# Patient Record
Sex: FEMALE | Race: OTHER | Employment: FULL TIME | URBAN - METROPOLITAN AREA
[De-identification: names, ages, dates, MRNs, and addresses within clinical notes are randomized per-mention and may not be internally consistent; named-entity substitution may affect disease eponyms.]

---

## 2021-02-10 ENCOUNTER — HOSPITAL ENCOUNTER (EMERGENCY)
Age: 31
Discharge: HOME | End: 2021-02-10
Attending: EMERGENCY MEDICINE
Payer: OTHER GOVERNMENT

## 2021-02-10 ENCOUNTER — APPOINTMENT (OUTPATIENT)
Dept: CT IMAGING | Age: 31
End: 2021-02-10
Attending: PHYSICIAN ASSISTANT
Payer: OTHER GOVERNMENT

## 2021-02-10 VITALS
WEIGHT: 106 LBS | HEIGHT: 62 IN | SYSTOLIC BLOOD PRESSURE: 135 MMHG | DIASTOLIC BLOOD PRESSURE: 72 MMHG | TEMPERATURE: 98.6 F | HEART RATE: 99 BPM | BODY MASS INDEX: 19.51 KG/M2 | OXYGEN SATURATION: 99 % | RESPIRATION RATE: 14 BRPM

## 2021-02-10 DIAGNOSIS — R30.0 DYSURIA: Primary | ICD-10-CM

## 2021-02-10 DIAGNOSIS — R10.9 ACUTE RIGHT FLANK PAIN: ICD-10-CM

## 2021-02-10 LAB
ALBUMIN SERPL-MCNC: 3.9 G/DL (ref 3.4–5)
ALBUMIN/GLOB SERPL: 1.2 {RATIO} (ref 0.8–1.7)
ALP SERPL-CCNC: 105 U/L (ref 45–117)
ALT SERPL-CCNC: 20 U/L (ref 13–56)
ANION GAP SERPL CALC-SCNC: 3 MMOL/L (ref 3–18)
APPEARANCE UR: CLEAR
AST SERPL-CCNC: 10 U/L (ref 10–38)
BACTERIA URNS QL MICRO: ABNORMAL /HPF
BASOPHILS # BLD: 0 K/UL (ref 0–0.1)
BASOPHILS NFR BLD: 1 % (ref 0–2)
BILIRUB SERPL-MCNC: 0.2 MG/DL (ref 0.2–1)
BILIRUB UR QL: NEGATIVE
BUN SERPL-MCNC: 16 MG/DL (ref 7–18)
BUN/CREAT SERPL: 22 (ref 12–20)
CALCIUM SERPL-MCNC: 9 MG/DL (ref 8.5–10.1)
CHLORIDE SERPL-SCNC: 109 MMOL/L (ref 100–111)
CO2 SERPL-SCNC: 28 MMOL/L (ref 21–32)
COLOR UR: YELLOW
CREAT SERPL-MCNC: 0.74 MG/DL (ref 0.6–1.3)
DIFFERENTIAL METHOD BLD: NORMAL
EOSINOPHIL # BLD: 0.1 K/UL (ref 0–0.4)
EOSINOPHIL NFR BLD: 2 % (ref 0–5)
EPITH CASTS URNS QL MICRO: ABNORMAL /LPF (ref 0–5)
ERYTHROCYTE [DISTWIDTH] IN BLOOD BY AUTOMATED COUNT: 12.9 % (ref 11.6–14.5)
GLOBULIN SER CALC-MCNC: 3.3 G/DL (ref 2–4)
GLUCOSE SERPL-MCNC: 94 MG/DL (ref 74–99)
GLUCOSE UR STRIP.AUTO-MCNC: NEGATIVE MG/DL
HCG UR QL: NEGATIVE
HCT VFR BLD AUTO: 38.6 % (ref 35–45)
HGB BLD-MCNC: 12.5 G/DL (ref 12–16)
HGB UR QL STRIP: ABNORMAL
KETONES UR QL STRIP.AUTO: ABNORMAL MG/DL
LEUKOCYTE ESTERASE UR QL STRIP.AUTO: NEGATIVE
LIPASE SERPL-CCNC: 121 U/L (ref 73–393)
LYMPHOCYTES # BLD: 1.6 K/UL (ref 0.9–3.6)
LYMPHOCYTES NFR BLD: 30 % (ref 21–52)
MCH RBC QN AUTO: 28.2 PG (ref 24–34)
MCHC RBC AUTO-ENTMCNC: 32.4 G/DL (ref 31–37)
MCV RBC AUTO: 87.1 FL (ref 74–97)
MONOCYTES # BLD: 0.5 K/UL (ref 0.05–1.2)
MONOCYTES NFR BLD: 10 % (ref 3–10)
NEUTS SEG # BLD: 3.1 K/UL (ref 1.8–8)
NEUTS SEG NFR BLD: 57 % (ref 40–73)
NITRITE UR QL STRIP.AUTO: NEGATIVE
PH UR STRIP: 7.5 [PH] (ref 5–8)
PLATELET # BLD AUTO: 288 K/UL (ref 135–420)
PMV BLD AUTO: 9.8 FL (ref 9.2–11.8)
POTASSIUM SERPL-SCNC: 4.1 MMOL/L (ref 3.5–5.5)
PROT SERPL-MCNC: 7.2 G/DL (ref 6.4–8.2)
PROT UR STRIP-MCNC: NEGATIVE MG/DL
RBC # BLD AUTO: 4.43 M/UL (ref 4.2–5.3)
RBC #/AREA URNS HPF: ABNORMAL /HPF (ref 0–5)
SERVICE CMNT-IMP: NORMAL
SODIUM SERPL-SCNC: 140 MMOL/L (ref 136–145)
SP GR UR REFRACTOMETRY: 1.02 (ref 1–1.03)
UROBILINOGEN UR QL STRIP.AUTO: 0.2 EU/DL (ref 0.2–1)
WBC # BLD AUTO: 5.4 K/UL (ref 4.6–13.2)
WBC URNS QL MICRO: ABNORMAL /HPF (ref 0–5)
WET PREP GENITAL: NORMAL

## 2021-02-10 PROCEDURE — 99282 EMERGENCY DEPT VISIT SF MDM: CPT

## 2021-02-10 PROCEDURE — 83690 ASSAY OF LIPASE: CPT

## 2021-02-10 PROCEDURE — 80053 COMPREHEN METABOLIC PANEL: CPT

## 2021-02-10 PROCEDURE — 81025 URINE PREGNANCY TEST: CPT

## 2021-02-10 PROCEDURE — 87491 CHLMYD TRACH DNA AMP PROBE: CPT

## 2021-02-10 PROCEDURE — 85025 COMPLETE CBC W/AUTO DIFF WBC: CPT

## 2021-02-10 PROCEDURE — 87086 URINE CULTURE/COLONY COUNT: CPT

## 2021-02-10 PROCEDURE — 87210 SMEAR WET MOUNT SALINE/INK: CPT

## 2021-02-10 PROCEDURE — 81001 URINALYSIS AUTO W/SCOPE: CPT

## 2021-02-10 PROCEDURE — 74176 CT ABD & PELVIS W/O CONTRAST: CPT

## 2021-02-10 RX ORDER — HYDROCODONE BITARTRATE AND ACETAMINOPHEN 5; 325 MG/1; MG/1
1 TABLET ORAL
Qty: 10 TAB | Refills: 0 | Status: SHIPPED | OUTPATIENT
Start: 2021-02-10 | End: 2021-02-15

## 2021-02-10 RX ORDER — PHENAZOPYRIDINE HYDROCHLORIDE 200 MG/1
200 TABLET, FILM COATED ORAL 3 TIMES DAILY
Qty: 6 TAB | Refills: 0 | Status: SHIPPED | OUTPATIENT
Start: 2021-02-10 | End: 2021-02-12

## 2021-02-10 NOTE — ED TRIAGE NOTES
Pt to ED for right flank pain starting last night, reports recent hx of UTI on 1/25/21, reports giving urine sample Monday at [facility] [place], but told culture was negative today. Pt denies fever/chills, but does report nausea.

## 2021-02-12 LAB
BACTERIA SPEC CULT: NORMAL
C TRACH RRNA SPEC QL NAA+PROBE: NEGATIVE
N GONORRHOEA RRNA SPEC QL NAA+PROBE: NEGATIVE
SERVICE CMNT-IMP: NORMAL
SPECIMEN SOURCE: NORMAL

## 2021-09-17 ENCOUNTER — TRANSCRIBE ORDER (OUTPATIENT)
Dept: SCHEDULING | Age: 31
End: 2021-09-17

## 2021-09-17 DIAGNOSIS — R10.11 RUQ PAIN: Primary | ICD-10-CM

## 2021-09-20 ENCOUNTER — HOSPITAL ENCOUNTER (OUTPATIENT)
Dept: ULTRASOUND IMAGING | Age: 31
Discharge: HOME | End: 2021-09-20
Attending: OBSTETRICS & GYNECOLOGY
Payer: COMMERCIAL

## 2021-09-20 DIAGNOSIS — R10.11 RUQ PAIN: ICD-10-CM

## 2021-09-20 PROCEDURE — 76705 ECHO EXAM OF ABDOMEN: CPT

## 2022-01-24 ENCOUNTER — HOSPITAL ENCOUNTER (OUTPATIENT)
Age: 32
Discharge: HOME | End: 2022-01-24
Attending: OBSTETRICS & GYNECOLOGY | Admitting: OBSTETRICS & GYNECOLOGY
Payer: COMMERCIAL

## 2022-01-24 VITALS
WEIGHT: 140 LBS | DIASTOLIC BLOOD PRESSURE: 62 MMHG | SYSTOLIC BLOOD PRESSURE: 109 MMHG | BODY MASS INDEX: 25.76 KG/M2 | HEIGHT: 62 IN | HEART RATE: 95 BPM

## 2022-01-24 PROBLEM — Z34.90 PREGNANCY: Status: ACTIVE | Noted: 2022-01-24

## 2022-01-24 LAB
APPEARANCE UR: CLEAR
BILIRUB UR QL: NEGATIVE
COLOR UR: YELLOW
FIBRONECTIN FETAL VAG QL: NEGATIVE
GLUCOSE UR QL STRIP.AUTO: 100 MG/DL
KETONES UR-MCNC: 40 MG/DL
LEUKOCYTE ESTERASE UR QL STRIP: NEGATIVE
NITRITE UR QL: NEGATIVE
PH UR: 5.5 [PH] (ref 5–9)
PROT UR QL: ABNORMAL MG/DL
RBC # UR STRIP: NEGATIVE /UL
SERVICE CMNT-IMP: ABNORMAL
SP GR UR: >1.03 (ref 1–1.02)
UROBILINOGEN UR QL: 0.2 EU/DL (ref 0.2–1)

## 2022-01-24 PROCEDURE — 81003 URINALYSIS AUTO W/O SCOPE: CPT

## 2022-01-24 PROCEDURE — 59020 FETAL CONTRACT STRESS TEST: CPT

## 2022-01-24 PROCEDURE — 82731 ASSAY OF FETAL FIBRONECTIN: CPT

## 2022-01-24 PROCEDURE — 59025 FETAL NON-STRESS TEST: CPT

## 2022-01-24 PROCEDURE — 74011250636 HC RX REV CODE- 250/636: Performed by: ADVANCED PRACTICE MIDWIFE

## 2022-01-24 RX ADMIN — SODIUM CHLORIDE, SODIUM LACTATE, POTASSIUM CHLORIDE, AND CALCIUM CHLORIDE 1000 ML: 600; 310; 30; 20 INJECTION, SOLUTION INTRAVENOUS at 18:05

## 2022-01-24 NOTE — PROGRESS NOTES
Presented to L&D c/o abd and back pain since last Monday. Was seen at MD office last week for same. VE at that time cervix closed per pt. Denies ROM and vaginal bleeding. EFM begun. URIAH Welch notified of pt arrival and cc. Orders received.

## 2022-01-25 NOTE — PROGRESS NOTES
2000: FFN negative, FHT reactive with positive fetal movement. No contractions noted. Per oliver Malone to discharge patient to home.

## 2022-01-25 NOTE — PROGRESS NOTES
HPI:    Subjective: reports back and abdominal pain, denies VB or LOF, reports good FM     aisha Jefferson 27 y.o. G1  at 31w0d presents to L&D with c/o see above. Assessment:  Past Medical History:   Diagnosis Date    Ill-defined condition     PFO    Ill-defined condition     migraines    Patent foramen ovale      Past Surgical History:   Procedure Laterality Date    HX GI      upper and lower endoscopy    HX HEENT      wisom teeth       Allergies   Allergen Reactions    Naproxen Anxiety     Prior to Admission medications    Medication Sig Start Date End Date Taking? Authorizing Provider   prenatal multivit-ca-min-fe-fa tab Take  by mouth. Yes Provider, Historical        Objective:     Vitals:  Vitals:    01/24/22 1726   Weight: 63.5 kg (140 lb)   Height: 5' 2\" (1.575 m)        Physical Exam:  Patient without distress. Abdomen: soft, nontender  Fundus: soft and non tender  Perineum: blood absent, amniotic fluid absent  Cervical Exam: Closed/Thick/High  Membranes:  Intact  Fetal Heart Rate: Reactive  Baseline: 145 per minute  Variability: moderate  Accelerations: yes  Decelerations: none  Uterine contractions: none  Uterine irritability: no    Ffn: negative  U/A: Urine dipstick shows positive for moderate glucose, tr. protein, small ketones. S.G. > 1.030    Assessment/Plan: dehydration, reassuring fetal status, no e/o PTL     Plan: DC home with standard & ER labor precautions. Follow-up in office for routine visit.      Signed By:  Jennifer Russell CNM     January 24, 2022

## 2022-03-09 ENCOUNTER — ANESTHESIA EVENT (OUTPATIENT)
Dept: LABOR AND DELIVERY | Age: 32
End: 2022-03-09
Payer: COMMERCIAL

## 2022-03-09 ENCOUNTER — ANESTHESIA (OUTPATIENT)
Dept: LABOR AND DELIVERY | Age: 32
End: 2022-03-09
Payer: COMMERCIAL

## 2022-03-09 ENCOUNTER — HOSPITAL ENCOUNTER (INPATIENT)
Age: 32
LOS: 1 days | Discharge: HOME | End: 2022-03-10
Attending: OBSTETRICS & GYNECOLOGY | Admitting: OBSTETRICS & GYNECOLOGY
Payer: COMMERCIAL

## 2022-03-09 PROBLEM — Z3A.37 37 WEEKS GESTATION OF PREGNANCY: Status: ACTIVE | Noted: 2022-03-09

## 2022-03-09 PROBLEM — Z37.9 NORMAL LABOR: Status: ACTIVE | Noted: 2022-03-09

## 2022-03-09 LAB
ABO + RH BLD: NORMAL
BASOPHILS # BLD: 0.1 K/UL (ref 0–0.1)
BASOPHILS NFR BLD: 0 % (ref 0–2)
BLOOD GROUP ANTIBODIES SERPL: NORMAL
DIFFERENTIAL METHOD BLD: ABNORMAL
EOSINOPHIL # BLD: 0.1 K/UL (ref 0–0.4)
EOSINOPHIL NFR BLD: 0 % (ref 0–5)
ERYTHROCYTE [DISTWIDTH] IN BLOOD BY AUTOMATED COUNT: 13.5 % (ref 11.6–14.5)
HCT VFR BLD AUTO: 34.3 % (ref 35–45)
HGB BLD-MCNC: 11.2 G/DL (ref 12–16)
IMM GRANULOCYTES # BLD AUTO: 0.1 K/UL (ref 0–0.04)
IMM GRANULOCYTES NFR BLD AUTO: 1 % (ref 0–0.5)
LYMPHOCYTES # BLD: 1.6 K/UL (ref 0.9–3.6)
LYMPHOCYTES NFR BLD: 14 % (ref 21–52)
MCH RBC QN AUTO: 27.7 PG (ref 24–34)
MCHC RBC AUTO-ENTMCNC: 32.7 G/DL (ref 31–37)
MCV RBC AUTO: 84.9 FL (ref 78–100)
MONOCYTES # BLD: 0.8 K/UL (ref 0.05–1.2)
MONOCYTES NFR BLD: 7 % (ref 3–10)
NEUTS SEG # BLD: 9.3 K/UL (ref 1.8–8)
NEUTS SEG NFR BLD: 78 % (ref 40–73)
NRBC # BLD: 0 K/UL (ref 0–0.01)
NRBC BLD-RTO: 0 PER 100 WBC
PLATELET # BLD AUTO: 246 K/UL (ref 135–420)
PMV BLD AUTO: 11.1 FL (ref 9.2–11.8)
RBC # BLD AUTO: 4.04 M/UL (ref 4.2–5.3)
SPECIMEN EXP DATE BLD: NORMAL
WBC # BLD AUTO: 11.9 K/UL (ref 4.6–13.2)

## 2022-03-09 PROCEDURE — 85025 COMPLETE CBC W/AUTO DIFF WBC: CPT

## 2022-03-09 PROCEDURE — 0HQ9XZZ REPAIR PERINEUM SKIN, EXTERNAL APPROACH: ICD-10-PCS | Performed by: OBSTETRICS & GYNECOLOGY

## 2022-03-09 PROCEDURE — 75410000002 HC LABOR FEE PER 1 HR

## 2022-03-09 PROCEDURE — 74011000250 HC RX REV CODE- 250: Performed by: SPECIALIST

## 2022-03-09 PROCEDURE — 59025 FETAL NON-STRESS TEST: CPT

## 2022-03-09 PROCEDURE — 77030019905 HC CATH URETH INTMIT MDII -A

## 2022-03-09 PROCEDURE — 99282 EMERGENCY DEPT VISIT SF MDM: CPT

## 2022-03-09 PROCEDURE — 74011250636 HC RX REV CODE- 250/636: Performed by: SPECIALIST

## 2022-03-09 PROCEDURE — 76060000078 HC EPIDURAL ANESTHESIA

## 2022-03-09 PROCEDURE — 74011000258 HC RX REV CODE- 258: Performed by: SPECIALIST

## 2022-03-09 PROCEDURE — 86900 BLOOD TYPING SEROLOGIC ABO: CPT

## 2022-03-09 PROCEDURE — 4A1HXCZ MONITORING OF PRODUCTS OF CONCEPTION, CARDIAC RATE, EXTERNAL APPROACH: ICD-10-PCS | Performed by: OBSTETRICS & GYNECOLOGY

## 2022-03-09 PROCEDURE — 77030009413 HC ELECTRD SCALP COVD -A

## 2022-03-09 PROCEDURE — 75410000003 HC RECOV DEL/VAG/CSECN EA 0.5 HR

## 2022-03-09 PROCEDURE — 74011250637 HC RX REV CODE- 250/637: Performed by: OBSTETRICS & GYNECOLOGY

## 2022-03-09 PROCEDURE — 88307 TISSUE EXAM BY PATHOLOGIST: CPT

## 2022-03-09 PROCEDURE — 65270000029 HC RM PRIVATE

## 2022-03-09 PROCEDURE — 75410000000 HC DELIVERY VAGINAL/SINGLE

## 2022-03-09 RX ORDER — SODIUM CHLORIDE, SODIUM LACTATE, POTASSIUM CHLORIDE, CALCIUM CHLORIDE 600; 310; 30; 20 MG/100ML; MG/100ML; MG/100ML; MG/100ML
125 INJECTION, SOLUTION INTRAVENOUS CONTINUOUS
Status: DISCONTINUED | OUTPATIENT
Start: 2022-03-09 | End: 2022-03-09 | Stop reason: HOSPADM

## 2022-03-09 RX ORDER — PROMETHAZINE HYDROCHLORIDE 25 MG/ML
25 INJECTION, SOLUTION INTRAMUSCULAR; INTRAVENOUS
Status: DISCONTINUED | OUTPATIENT
Start: 2022-03-09 | End: 2022-03-11 | Stop reason: HOSPADM

## 2022-03-09 RX ORDER — OXYCODONE AND ACETAMINOPHEN 5; 325 MG/1; MG/1
2 TABLET ORAL
Status: DISCONTINUED | OUTPATIENT
Start: 2022-03-09 | End: 2022-03-11 | Stop reason: HOSPADM

## 2022-03-09 RX ORDER — NALBUPHINE HYDROCHLORIDE 10 MG/ML
10 INJECTION, SOLUTION INTRAMUSCULAR; INTRAVENOUS; SUBCUTANEOUS
Status: DISCONTINUED | OUTPATIENT
Start: 2022-03-09 | End: 2022-03-09 | Stop reason: HOSPADM

## 2022-03-09 RX ORDER — LIDOCAINE HYDROCHLORIDE 10 MG/ML
20 INJECTION, SOLUTION EPIDURAL; INFILTRATION; INTRACAUDAL; PERINEURAL AS NEEDED
Status: DISCONTINUED | OUTPATIENT
Start: 2022-03-09 | End: 2022-03-09 | Stop reason: HOSPADM

## 2022-03-09 RX ORDER — LANOLIN ALCOHOL/MO/W.PET/CERES
3 CREAM (GRAM) TOPICAL
Status: DISCONTINUED | OUTPATIENT
Start: 2022-03-09 | End: 2022-03-11 | Stop reason: HOSPADM

## 2022-03-09 RX ORDER — SODIUM CHLORIDE 0.9 % (FLUSH) 0.9 %
5-40 SYRINGE (ML) INJECTION EVERY 8 HOURS
Status: DISCONTINUED | OUTPATIENT
Start: 2022-03-09 | End: 2022-03-09 | Stop reason: HOSPADM

## 2022-03-09 RX ORDER — IBUPROFEN 400 MG/1
800 TABLET ORAL
Status: DISCONTINUED | OUTPATIENT
Start: 2022-03-09 | End: 2022-03-11 | Stop reason: HOSPADM

## 2022-03-09 RX ORDER — METHYLERGONOVINE MALEATE 0.2 MG/ML
0.2 INJECTION INTRAVENOUS AS NEEDED
Status: DISCONTINUED | OUTPATIENT
Start: 2022-03-09 | End: 2022-03-09 | Stop reason: HOSPADM

## 2022-03-09 RX ORDER — SODIUM CHLORIDE 0.9 % (FLUSH) 0.9 %
5-40 SYRINGE (ML) INJECTION AS NEEDED
Status: DISCONTINUED | OUTPATIENT
Start: 2022-03-09 | End: 2022-03-09 | Stop reason: HOSPADM

## 2022-03-09 RX ORDER — FENTANYL/ROPIVACAINE/NS/PF 2MCG/ML-.1
1-15 PLASTIC BAG, INJECTION (ML) EPIDURAL
Status: DISCONTINUED | OUTPATIENT
Start: 2022-03-09 | End: 2022-03-09 | Stop reason: HOSPADM

## 2022-03-09 RX ORDER — FENTANYL CITRATE 50 UG/ML
100 INJECTION, SOLUTION INTRAMUSCULAR; INTRAVENOUS ONCE
Status: COMPLETED | OUTPATIENT
Start: 2022-03-09 | End: 2022-03-09

## 2022-03-09 RX ORDER — OXYTOCIN/RINGER'S LACTATE 30/500 ML
10 PLASTIC BAG, INJECTION (ML) INTRAVENOUS AS NEEDED
Status: DISCONTINUED | OUTPATIENT
Start: 2022-03-09 | End: 2022-03-09 | Stop reason: HOSPADM

## 2022-03-09 RX ORDER — OXYTOCIN/0.9 % SODIUM CHLORIDE 30/500 ML
87.3 PLASTIC BAG, INJECTION (ML) INTRAVENOUS AS NEEDED
Status: DISCONTINUED | OUTPATIENT
Start: 2022-03-09 | End: 2022-03-09 | Stop reason: HOSPADM

## 2022-03-09 RX ORDER — LIDOCAINE HYDROCHLORIDE AND EPINEPHRINE 15; 5 MG/ML; UG/ML
INJECTION, SOLUTION EPIDURAL
Status: SHIPPED | OUTPATIENT
Start: 2022-03-09 | End: 2022-03-09

## 2022-03-09 RX ORDER — AMOXICILLIN 250 MG
1 CAPSULE ORAL
Status: DISCONTINUED | OUTPATIENT
Start: 2022-03-09 | End: 2022-03-11 | Stop reason: HOSPADM

## 2022-03-09 RX ORDER — FENTANYL CITRATE 50 UG/ML
INJECTION, SOLUTION INTRAMUSCULAR; INTRAVENOUS AS NEEDED
Status: DISCONTINUED | OUTPATIENT
Start: 2022-03-09 | End: 2022-03-09 | Stop reason: HOSPADM

## 2022-03-09 RX ORDER — ACETAMINOPHEN 325 MG/1
650 TABLET ORAL
Status: DISCONTINUED | OUTPATIENT
Start: 2022-03-09 | End: 2022-03-11 | Stop reason: HOSPADM

## 2022-03-09 RX ORDER — TERBUTALINE SULFATE 1 MG/ML
0.25 INJECTION SUBCUTANEOUS
Status: DISCONTINUED | OUTPATIENT
Start: 2022-03-09 | End: 2022-03-09 | Stop reason: HOSPADM

## 2022-03-09 RX ORDER — BUTORPHANOL TARTRATE 2 MG/ML
2 INJECTION INTRAMUSCULAR; INTRAVENOUS
Status: DISCONTINUED | OUTPATIENT
Start: 2022-03-09 | End: 2022-03-09 | Stop reason: HOSPADM

## 2022-03-09 RX ORDER — MINERAL OIL
30 OIL (ML) ORAL AS NEEDED
Status: DISCONTINUED | OUTPATIENT
Start: 2022-03-09 | End: 2022-03-09 | Stop reason: HOSPADM

## 2022-03-09 RX ORDER — HYDROMORPHONE HYDROCHLORIDE 1 MG/ML
1 INJECTION, SOLUTION INTRAMUSCULAR; INTRAVENOUS; SUBCUTANEOUS
Status: DISCONTINUED | OUTPATIENT
Start: 2022-03-09 | End: 2022-03-09 | Stop reason: HOSPADM

## 2022-03-09 RX ADMIN — FENTANYL CITRATE 100 MCG: 50 INJECTION, SOLUTION INTRAMUSCULAR; INTRAVENOUS at 10:34

## 2022-03-09 RX ADMIN — ROPIVACAINE HYDROCHLORIDE 10 ML/HR: 5 INJECTION, SOLUTION EPIDURAL; INFILTRATION; PERINEURAL at 16:26

## 2022-03-09 RX ADMIN — ROPIVACAINE HYDROCHLORIDE 10 ML/HR: 5 INJECTION, SOLUTION EPIDURAL; INFILTRATION; PERINEURAL at 10:34

## 2022-03-09 RX ADMIN — ACETAMINOPHEN 650 MG: 325 TABLET ORAL at 15:45

## 2022-03-09 RX ADMIN — LIDOCAINE HYDROCHLORIDE,EPINEPHRINE BITARTRATE 5 ML: 15; .005 INJECTION, SOLUTION EPIDURAL; INFILTRATION; INTRACAUDAL; PERINEURAL at 10:25

## 2022-03-09 RX ADMIN — FENTANYL CITRATE 100 MCG: 50 INJECTION, SOLUTION INTRAMUSCULAR; INTRAVENOUS at 10:35

## 2022-03-09 NOTE — H&P
Ostetrical History and Physical    Subjective:     Date of Admission: 3/9/2022    Patient is a 27 y.o.   female admitted with labor, with SROM in office during exam.    For Obstetric history, see junl.    For Review of Systems, see prenatal    Past Medical History:   Diagnosis Date    Ill-defined condition     PFO    Ill-defined condition     migraines    Patent foramen ovale       Past Surgical History:   Procedure Laterality Date    HX GI      upper and lower endoscopy    HX HEENT      wisom teeth      Prior to Admission medications    Medication Sig Start Date End Date Taking? Authorizing Provider   prenatal multivit-ca-min-fe-fa tab Take  by mouth. Yes Provider, Historical     Allergies   Allergen Reactions    Naproxen Anxiety      Social History     Tobacco Use    Smoking status: Never Smoker    Smokeless tobacco: Never Used   Substance Use Topics    Alcohol use: No      Family History   Problem Relation Age of Onset    Cancer Mother     Hypertension Mother     Asthma Father     Heart Disease Father     Hypertension Father     Diabetes Maternal Grandmother     Diabetes Maternal Grandfather     Stroke Paternal Grandmother     Heart Disease Paternal Grandfather     Stroke Paternal Grandfather           Objective:     Height 5' 2\" (1.575 m), weight 68.9 kg (152 lb), last menstrual period 2021. No data recorded. No intake/output data recorded. No intake/output data recorded. HEENT: No pallor, no jaundice, Thyroid and throat normal  RESPIRATORY: Clear to A & P  CVS: pulse reg, HS normal  ABDOMEN: Gravid. Vertex. EFW=8lb +-1lb. No abnormal tenderness.    Pelvic: Cervix 5,   Effaced: 75%  Station:  -2  Data Review:   Recent Results (from the past 24 hour(s))   CBC WITH AUTOMATED DIFF    Collection Time: 22  9:38 AM   Result Value Ref Range    WBC 11.9 4.6 - 13.2 K/uL    RBC 4.04 (L) 4.20 - 5.30 M/uL    HGB 11.2 (L) 12.0 - 16.0 g/dL    HCT 34.3 (L) 35.0 - 45.0 % MCV 84.9 78.0 - 100.0 FL    MCH 27.7 24.0 - 34.0 PG    MCHC 32.7 31.0 - 37.0 g/dL    RDW 13.5 11.6 - 14.5 %    PLATELET 453 129 - 285 K/uL    MPV 11.1 9.2 - 11.8 FL    NRBC 0.0 0  WBC    ABSOLUTE NRBC 0.00 0.00 - 0.01 K/uL    NEUTROPHILS 78 (H) 40 - 73 %    LYMPHOCYTES 14 (L) 21 - 52 %    MONOCYTES 7 3 - 10 %    EOSINOPHILS 0 0 - 5 %    BASOPHILS 0 0 - 2 %    IMMATURE GRANULOCYTES 1 (H) 0.0 - 0.5 %    ABS. NEUTROPHILS 9.3 (H) 1.8 - 8.0 K/UL    ABS. LYMPHOCYTES 1.6 0.9 - 3.6 K/UL    ABS. MONOCYTES 0.8 0.05 - 1.2 K/UL    ABS. EOSINOPHILS 0.1 0.0 - 0.4 K/UL    ABS. BASOPHILS 0.1 0.0 - 0.1 K/UL    ABS. IMM. GRANS. 0.1 (H) 0.00 - 0.04 K/UL    DF AUTOMATED     TYPE & SCREEN    Collection Time: 03/09/22  9:38 AM   Result Value Ref Range    Crossmatch Expiration 03/12/2022,2359     ABO/Rh(D) [employer] POSITIVE     Antibody screen NEG      Monitor:  Reactivity:present Variability:present Baseline:within normal limits    Assessment:     Active Problems:    Normal labor (3/9/2022)      37 weeks gestation of pregnancy (3/9/2022)        Plan:   Allow to continue in labor. Epidural in    Check labs:  CBC  Check  Prenatal:    Disposition:     Type of admit:In-Patient    I saw and examined patient.     Signed By: Leandra Acevedo MD                         March 9, 2022

## 2022-03-09 NOTE — PROGRESS NOTES
1016- Update given to dr Fang Moulton aware of patient request for epidural    1025- Dr. April Moulton at bedside for epidural     1029- Timeout complete    1032- test dose given    1638- Test pushing, SVE complete    1643- Dr. Lavon Triplett called for delivery    78 [phone]- Begin pushing    1713- TOB live male infant    5- TRANSFER - OUT REPORT:    Verbal report given to GILBERT Martins (name) on Louretta Meals  being transferred to Mother Baby (unit) for routine progression of care       Report consisted of patients Situation, Background, Assessment and   Recommendations(SBAR). Information from the following report(s) SBAR, Kardex and MAR was reviewed with the receiving nurse. Lines:   Peripheral IV 03/09/22 Posterior;Right Hand (Active)        Opportunity for questions and clarification was provided.       Patient transported with:   Registered Nurse

## 2022-03-09 NOTE — ANESTHESIA PROCEDURE NOTES
Epidural Block    Patient location during procedure: OB  Start time: 3/9/2022 10:25 AM  End time: 3/9/2022 10:37 AM  Reason for block: labor epidural  Staffing  Performed: attending   Anesthesiologist: Jb Hebert MD  Preanesthetic Checklist  Completed: patient identified, IV checked, site marked, risks and benefits discussed, surgical consent, monitors and equipment checked, pre-op evaluation and timeout performed  Block Placement  Patient position: sitting  Prep: ChloraPrep  Sterility prep: drape, cap, gloves, gown, hand and mask  Sedation level: no sedation  Patient monitoring: frequent blood pressure checks and heart rate  Approach: midline  Location: lumbar  Epidural  Loss of resistance technique: air  Guidance: landmark technique  Needle  Needle type: Tuohy   Needle gauge: 17 G  Needle length: 9 cm  Needle insertion depth: 5 cm  Catheter type: end hole  Catheter size: 19 G  Catheter at skin depth: 9 cm  Catheter securement method: clear occlusive dressing, liquid medical adhesive and surgical tape  Test dose: negative  Medications Administered  Lidocaine-EPINEPHrine (XYLOCAINE) 1.5 %-1:200,000 Epidural, 5 mL  Assessment  Block outcome: pain improved  Number of attempts: 1  Procedure assessment: patient tolerated procedure well with no immediate complications

## 2022-03-09 NOTE — ANESTHESIA PREPROCEDURE EVALUATION
Relevant Problems   No relevant active problems       Anesthetic History   No history of anesthetic complications            Review of Systems / Medical History  Patient summary reviewed, nursing notes reviewed and pertinent labs reviewed    Pulmonary  Within defined limits                 Neuro/Psych         Headaches     Cardiovascular                    Comments: PFO   GI/Hepatic/Renal  Within defined limits              Endo/Other  Within defined limits           Other Findings              Physical Exam    Airway  Mallampati: II  TM Distance: 4 - 6 cm  Neck ROM: normal range of motion   Mouth opening: Normal     Cardiovascular               Dental  No notable dental hx       Pulmonary                 Abdominal         Other Findings            Anesthetic Plan    ASA: 2  Anesthesia type: epidural            Anesthetic plan and risks discussed with: Patient and Family      R/B discussed including ha backache bleeding infection nerve damage paralysis.

## 2022-03-10 VITALS
SYSTOLIC BLOOD PRESSURE: 113 MMHG | DIASTOLIC BLOOD PRESSURE: 67 MMHG | WEIGHT: 152 LBS | BODY MASS INDEX: 27.97 KG/M2 | HEIGHT: 62 IN | TEMPERATURE: 98.5 F | OXYGEN SATURATION: 99 % | HEART RATE: 101 BPM | RESPIRATION RATE: 18 BRPM

## 2022-03-10 LAB
HCT VFR BLD AUTO: 31 % (ref 35–45)
HGB BLD-MCNC: 9.9 G/DL (ref 12–16)

## 2022-03-10 PROCEDURE — 74011250637 HC RX REV CODE- 250/637: Performed by: OBSTETRICS & GYNECOLOGY

## 2022-03-10 PROCEDURE — 36415 COLL VENOUS BLD VENIPUNCTURE: CPT

## 2022-03-10 PROCEDURE — 85018 HEMOGLOBIN: CPT

## 2022-03-10 RX ADMIN — IBUPROFEN 800 MG: 400 TABLET, FILM COATED ORAL at 16:11

## 2022-03-10 RX ADMIN — IBUPROFEN 800 MG: 400 TABLET, FILM COATED ORAL at 00:19

## 2022-03-10 RX ADMIN — IBUPROFEN 800 MG: 400 TABLET, FILM COATED ORAL at 07:51

## 2022-03-10 NOTE — PROGRESS NOTES
0745 Bedside and Verbal shift change report given to GILBERT Schmitz RN (oncoming nurse) by THUAN Robbins LPN (offgoing nurse). Report included the following information SBAR, Kardex, Procedure Summary, Intake/Output, MAR and Recent Results. 0 motrin given    0915 shift assessment complete    1005 IV removed    1153 patient attempting to feed     1345 patient has no needs or concerns at this time    1525 reassessment complete, educated patient on how to stimulate  to stay nursing    1720 patient nursing , to call when finished    1855  back to room    1935 Bedside and Verbal shift change report given to KELLE Hooks RN (oncoming nurse) by Elise Salgado RN (offgoing nurse). Report included the following information SBAR, Kardex, Procedure Summary, Intake/Output, MAR and Recent Results.

## 2022-03-10 NOTE — PROGRESS NOTES
1406 SVE 9.5/100/0.    1417 Dr. Monty Baltazar called for delivery. 1422 Straight cath performed. 1 Dr. Monty Baltazar at bedside. Pushing started. Received orders to labor down. 1437 Assisted patient to right lateral side with left leg in stirrup.

## 2022-03-10 NOTE — PROGRESS NOTES
Problem: Patient Education: Go to Patient Education Activity  Goal: Patient/Family Education  Outcome: Progressing Towards Goal     Problem: Vaginal Delivery: Day of Delivery-Post delivery  Goal: Activity/Safety  Outcome: Progressing Towards Goal  Goal: Consults, if ordered  Outcome: Progressing Towards Goal  Goal: Nutrition/Diet  Outcome: Progressing Towards Goal  Goal: Discharge Planning  Outcome: Progressing Towards Goal  Goal: Medications  Outcome: Progressing Towards Goal  Goal: Treatments/Interventions/Procedures  Outcome: Progressing Towards Goal  Goal: *Vital signs within defined limits  Outcome: Progressing Towards Goal  Goal: *Labs within defined limits  Outcome: Progressing Towards Goal  Goal: *Hemodynamically stable  Outcome: Progressing Towards Goal  Goal: *Optimal pain control at patient's stated goal  Outcome: Progressing Towards Goal  Goal: *Participates in infant care  Outcome: Progressing Towards Goal  Goal: *Demonstrates progressive activity  Outcome: Progressing Towards Goal  Goal: *Tolerating diet  Outcome: Progressing Towards Goal     Problem: Pain  Goal: *Control of Pain  Outcome: Progressing Towards Goal     Problem: Patient Education: Go to Patient Education Activity  Goal: Patient/Family Education  Outcome: Progressing Towards Goal

## 2022-03-10 NOTE — PROGRESS NOTES
TRANSFER - IN REPORT:    Verbal report received from EDUARDO Encarnacion RN (name) on [employer] Stamp  being received from L and D (unit) for routine progression of care      Report consisted of patients Situation, Background, Assessment and   Recommendations(SBAR). Information from the following report(s) SBAR, Intake/Output, MAR and Recent Results was reviewed with the receiving nurse. Opportunity for questions and clarification was provided. Assessment completed upon patients arrival to unit and care assumed. VSS. Oriented to room and unit. Denies needs. 2100-ambulated to bathroom. Voided without diff. Denies needs. 2235-VSS. Assessment completed. Denies needs. 0019-pain med given. Resting in bed.  0215-up in room. Denies needs. 0400-breast feeding. 0415-ambulated to [facility] for infant bath. 0445-back out to room with infant. 0600-resting in bed.  0745-Bedside and Verbal shift change report given to GILBERT Schmitz RN  (oncoming nurse) by THUAN Robbins LPN (offgoing nurse). Report given with SBAR, Kardex, Intake/Output, MAR and Recent Results.

## 2022-03-10 NOTE — PROGRESS NOTES
Progress Note    Patient: Rohit Baum MRN: [id number]  SSN: [id number]    YOB: 1990  Age: 27 y.o. Sex: female    ROOM:  [facility]/01      Subjective:     Postpartum Day: 1            Delivery: vaginal delivery    The patient feels well. The patient denies emotional concerns. The baby is well. Baby is feeding via breast.  The patient is ambulating well. The patient  tolerating a normal diet. Flatus has been passed.     Objective:      Patient Vitals for the past 24 hrs:   BP Temp Pulse Resp SpO2 Height Weight   03/09/22 2235 113/65 98 °F (36.7 °C) (!) 101 16 99 %     03/09/22 2005 117/72 98.8 °F (37.1 °C) (!) 116 16 98 %     03/09/22 1842     98 %     03/09/22 1837   (!) 107  99 %     03/09/22 1836 112/63         03/09/22 1832     98 %     03/09/22 1827     99 %     03/09/22 1822   (!) 108  98 %     03/09/22 1821 (!) 110/58         03/09/22 1817     98 %     03/09/22 1812     97 %     03/09/22 1807   (!) 115  97 %     03/09/22 1806 109/65         03/09/22 1802     97 %     03/09/22 1757     98 %     03/09/22 1752 (!) 106/51  (!) 119  98 %     03/09/22 1747     98 %     03/09/22 1742     99 %     03/09/22 1732     98 %     03/09/22 1714     97 %     03/09/22 1709     100 %     03/09/22 1704     99 %     03/09/22 1659     98 %     03/09/22 1651 135/69  (!) 122       03/09/22 1650     100 %     03/09/22 1645     100 %     03/09/22 1641 127/84  (!) 113       03/09/22 1640     100 %     03/09/22 1635     100 %     03/09/22 1631 123/71  (!) 114       03/09/22 1630     100 %     03/09/22 1625     100 %     03/09/22 1621 118/68  (!) 110       03/09/22 1620     99 %     03/09/22 1615     100 %     03/09/22 1611 124/70  (!) 111       03/09/22 1610     99 %     03/09/22 1605     100 %     03/09/22 1602 122/78  (!) 113       03/09/22 1600     100 %     03/09/22 1555     100 %     03/09/22 1551 126/68  (!) 119       03/09/22 1550     99 %     03/09/22 1545     99 %     03/09/22 1541 130/66  (!) 115       03/09/22 1540     99 %     03/09/22 1535     99 %     03/09/22 1531 126/70  (!) 122       03/09/22 1530     100 %     03/09/22 1525     99 %     03/09/22 1521 111/61  (!) 125       03/09/22 1520     100 %     03/09/22 1515     100 %     03/09/22 1511 116/76  (!) 116       03/09/22 1510     99 %     03/09/22 1505     100 %     03/09/22 1502 112/64  (!) 123       03/09/22 1500     100 %     03/09/22 1455     99 %     03/09/22 1452 (!) 119/57  (!) 124       03/09/22 1450     100 %     03/09/22 1445     100 %     03/09/22 1442 (!) 104/59  (!) 121       03/09/22 1440     100 %     03/09/22 1435     99 %     03/09/22 1431 130/78  (!) 124       03/09/22 1421 135/82  (!) 128       03/09/22 1411 118/87  (!) 119       03/09/22 1401 119/74  (!) 113       03/09/22 1351 121/78  (!) 108       03/09/22 1341 115/70  (!) 106       03/09/22 1332 97/73  (!) 119       03/09/22 1321 121/76  (!) 109       03/09/22 1311 116/80  (!) 111       03/09/22 1301 124/76  (!) 111       03/09/22 1251 121/73  (!) 101       03/09/22 1241 97/79  (!) 110       03/09/22 1231 111/78  (!) 102       03/09/22 1221 119/78  (!) 101       03/09/22 1211 117/73  97       03/09/22 1201 106/73  93       03/09/22 1157     97 %     03/09/22 1155     98 %     03/09/22 1151 111/72  97       03/09/22 1150     98 %     03/09/22 1145     100 %     03/09/22 1142     99 %     03/09/22 1141 114/74  97       03/09/22 1137     98 %     03/09/22 1132   96  98 %     03/09/22 1131 111/65       03/09/22 1127     98 %     03/09/22 1122   (!) 101  95 %     03/09/22 1121 114/71         03/09/22 1117     98 %     03/09/22 1112   97  98 %     03/09/22 1111 120/68         03/09/22 1107     98 %     03/09/22 1102   98  99 %     03/09/22 1101 111/74         03/09/22 1057     98 %     03/09/22 1052 (!) 107/35  100  99 %     03/09/22 1047   (!) 106  99 %     03/09/22 1046 117/73         03/09/22 1043 (!) 113/57  94       03/09/22 1042     99 %     03/09/22 1039 (!) 182/167  (!) 112       03/09/22 1037     98 %     03/09/22 1033 (!) 144/86  (!) 122       03/09/22 1032     98 %     03/09/22 0943      5' 2\" (1.575 m) 68.9 kg (152 lb)     Lochia:  appropriate   Uterine Fundus:   firm   Fundus Location:  -3   Incision:      DVT Evaluation:  No evidence of DVT seen on physical exam.  Negative Kolton's sign. No cords or calf tenderness. No significant calf/ankle edema. Lab/Data Review: All lab results for the last 24 hours reviewed. LABS: Recent Results (from the past 48 hour(s))   CBC WITH AUTOMATED DIFF    Collection Time: 03/09/22  9:38 AM   Result Value Ref Range    WBC 11.9 4.6 - 13.2 K/uL    RBC 4.04 (L) 4.20 - 5.30 M/uL    HGB 11.2 (L) 12.0 - 16.0 g/dL    HCT 34.3 (L) 35.0 - 45.0 %    MCV 84.9 78.0 - 100.0 FL    MCH 27.7 24.0 - 34.0 PG    MCHC 32.7 31.0 - 37.0 g/dL    RDW 13.5 11.6 - 14.5 %    PLATELET 163 338 - 895 K/uL    MPV 11.1 9.2 - 11.8 FL    NRBC 0.0 0  WBC    ABSOLUTE NRBC 0.00 0.00 - 0.01 K/uL    NEUTROPHILS 78 (H) 40 - 73 %    LYMPHOCYTES 14 (L) 21 - 52 %    MONOCYTES 7 3 - 10 %    EOSINOPHILS 0 0 - 5 %    BASOPHILS 0 0 - 2 %    IMMATURE GRANULOCYTES 1 (H) 0.0 - 0.5 %    ABS. NEUTROPHILS 9.3 (H) 1.8 - 8.0 K/UL    ABS. LYMPHOCYTES 1.6 0.9 - 3.6 K/UL    ABS. MONOCYTES 0.8 0.05 - 1.2 K/UL    ABS. EOSINOPHILS 0.1 0.0 - 0.4 K/UL    ABS. BASOPHILS 0.1 0.0 - 0.1 K/UL    ABS. IMM. GRANS. 0.1 (H) 0.00 - 0.04 K/UL    DF AUTOMATED     TYPE & SCREEN    Collection Time: 03/09/22  9:38 AM   Result Value Ref Range    Crossmatch Expiration 03/12/2022,6720     ABO/Rh(D) [place] POSITIVE     Antibody screen NEG    HGB & HCT    Collection Time: 03/10/22  4:15 AM   Result Value Ref Range    HGB 9.9 (L) 12.0 - 16.0 g/dL    HCT 31.0 (L) 35.0 - 45.0 %        Assessment:     Status post: Doing well postpartum vaginal delivery     Plan:     Postpartum care discussed including diet, ambulation, and actvitiy restrictions. Discussed circumcision: Benefits are that it may be easier to keep clean and various ethnic and Restorationist customs. Risks are bleeding most commonly, which is resolved with pressure or hemostatic gels or sponges. Scarring and infection are possible but extremely unlikely. It may not be be possible to remove all that we would like to remove if the shaft is short, as this would be more likely to lead to scarring. Wants circ done. Discharge instructions and questions answered.        Signed By: Denny Kay MD     March 10, 2022

## 2022-03-11 NOTE — LACTATION NOTE
This note was copied from a baby's chart. 25 Mom educated on breastfeeding basics--hunger cues, feeding on demand, waking baby if baby sleeps too long between feeds, importance of skin to skin, positioning and latching, risk of pacifier use and supplemental feedings, and importance of rooming in--and use of log sheet. Mom also educated on benefits of breastfeeding for herself and baby. Mom verbalized understanding. No questions at this time. Per mom, infant latching and nursing well.  Encouraged to call for assistance

## 2022-03-11 NOTE — PROGRESS NOTES
1935 Bedside and Verbal shift change report given to KELLE Caal RN (oncoming nurse) by Tanna Burgos. Courtney Desai RN (offgoing nurse). Report included the following information SBAR, Kardex, Procedure Summary, Intake/Output, MAR and Recent Results. 2100 Patient stable. OB assessment completed. 2110 Discharge teaching completed. No questions or concerns at this time. 2130 Patient discharged.

## 2022-03-11 NOTE — PROGRESS NOTES
Paged by RN for discharge orders. Informed by RN that plan was for mom to go home if baby is able to, and baby was cleared for discharge.

## 2022-03-11 NOTE — DISCHARGE INSTRUCTIONS
POST DELIVERY DISCHARGE INSTRUCTIONS    Name: Tonia Do  YOB: 1990  Primary Diagnosis: Active Problems:    Normal labor (3/9/2022)      37 weeks gestation of pregnancy (3/9/2022)      General:     Diet/Diet Restrictions:  Eight 8-ounce glasses of fluid daily (water, juices); avoid excessive caffeine intake. Meals/snacks as desired which are high in fiber and carbohydrates and low in fat and cholesterol. Physical Activity / Restrictions / Safety:     Avoid heavy lifting, no more that 8 lbs. For 2-3 weeks; limit use of stairs to 2 times daily for the first week home. No driving for one week. Avoid intercourse 4-6 weeks, no douching or tampon use. Check with obstetrician before starting or resuming an exercise program.         Discharge Instructions/Special Treatment/Home Care Needs:     Continue prenatal vitamins. Continue to use squirt bottle with warm water on your perineal area after each bathroom use until bleeding stops. Call your doctor for the following:     Fever over 100.4 degrees by mouth. Vaginal bleeding heavier than a normal menstrual period or clot larger than a golf ball. Red streaks or increased swelling of legs, painful red streaks on your breast.  Painful urination, constipation and increased pain or swelling or discharge with your incision. If you feel extremely anxious or overwhelmed. If you have thoughts of harming yourself and/or your baby. Pain Management:     Pain Management:   Take Acetaminophen (Tylenol) or Ibuprofen (Advil, Motrin), as directed for pain. Use a warm Sitz bath 3 times daily to relieve episiotomy or hemorrhoidal discomfort. Heating pad to  incision as needed. For hemorrhoidal discomfort, use Tucks and Anusol cream as needed and directed.

## 2022-03-18 PROBLEM — Z34.90 PREGNANCY: Status: ACTIVE | Noted: 2022-01-24

## 2022-03-19 PROBLEM — Z3A.37 37 WEEKS GESTATION OF PREGNANCY: Status: ACTIVE | Noted: 2022-03-09

## 2022-03-20 PROBLEM — Z37.9 NORMAL LABOR: Status: ACTIVE | Noted: 2022-03-09

## 2024-07-08 ENCOUNTER — HOSPITAL ENCOUNTER (OUTPATIENT)
Facility: HOSPITAL | Age: 34
Setting detail: RECURRING SERIES
Discharge: HOME | End: 2024-07-11
Payer: COMMERCIAL

## 2024-07-08 PROCEDURE — 97161 PT EVAL LOW COMPLEX 20 MIN: CPT

## 2024-07-08 PROCEDURE — 97110 THERAPEUTIC EXERCISES: CPT

## 2024-07-08 NOTE — PROGRESS NOTES
[facility]  [address] [place] [address]  Ph [phone]  Fx [phone]    Plan of Care/ Statement of Necessity for Physical Therapy Services    Patient name: Radha Flores Start of Care: 2024   Referral source: Hailee Tang APRN -* : 1990    Medical Diagnosis: Other symptoms and signs involving the genitourinary system [R39.89]   Onset Date:3/3/2024    Treatment Diagnosis: Other symptoms and signs involving the genitourinary system [R39.89]   Prior Hospitalization: see medical history Provider#: [id number]   Medications: Verified on Patient summary List     Comorbidities: 2 pregnancies/vaginal birth and currently 30 weeks  (due : 2024), umbilical hernia      Prior Level of Function: active lifestyle, No UI and no pubic symphysis pain           The Plan of Care and following information is based on the information from the initial evaluation.  Assessment/ key information: Patient is a 34 year old female presenting to Physical Therapy with c/o pubic symphysis pain, LBP,  and mixed urinary incontinence which is limiting ability function at previous level. Patient has  pain complaints with palpation of bilateral piriformis. Patient presents with decreased strength, coordination, and endurance of the pelvic floor muscles and decreased strength of postural stabilizers.    Patient presents with limited understanding of bladder and bowel anatomy/function, dietary irritants, and urge suppression. I feel patient would benefit from skilled therapeutic intervention to optimize highest functional level possible.     Evaluation Complexity HistoryHIGH Complexity :3+ comorbidities / personal factors will impact the outcome/ POC  ; Examination MEDIUM Complexity : 3 Standardized tests and measures addressin body structure, function, activity limitation and / or participation in recreation  ;Presentation MEDIUM Complexity : Evolving with changing characteristics  ;Clinical 
  Pt will report reduced pain of suprapubic region to 4/10 at worst to allow her to perform  activities with increased ease  Eval: 9/10 at worst    Pt will report voiding interval of 1 hours to improve QOL  Eval:   30 min        Long term goals: To be achieved in 8 treatments::  Pt reports bladder continence 75% of the time with cough/sneeze/laugh & walking to the toilet.   Eval: pt stress & urgency incontinence 1-3 times per day    Pt will report voiding interval of 1.5-2 hours to improve QOL  Eval:   30 min     Pt will report reduced pain of pubic symphysis region to 2/10 at worst to allow her to perform her work with increased ease  Eval: 10/10 at worst     Pt will report reduced pain of coccyx region to 2/10 at worst to allow her to perform her work with increased ease  Eval: 8-9/10 at worst     Pt will report reduced pain of lumbar region to 2/10 at worst to allow her to perform  activities with increased ease  Eval: 8-9/10 at worst     Pt will report reduced pain of suprapubic region to 2/10 at worst to allow her to perform  activities with increased ease  Eval: 9/10 at worst     Pt demonstrates improvement of current complaints evidenced by a 45 point  improvement in PFDI-20 to indicate increased QOL.  Eval:  75     Pt demonstrates independence with management tools & exercise program that are beneficial for current condition in order to feel comfortable with Pelvic floor PT D/C & not fear.  Eval: pt unaware of what activities to do reduce her symptoms and to avoid exacerbation of current condition   PLAN  []  Upgrade activities as tolerated     [x]  Continue plan of care  []  Update interventions per flow sheet       []  Discharge due to:_  []  Other:_      Adrianne Luis, PT 7/8/2024  10:28 AM

## 2024-07-18 ENCOUNTER — HOSPITAL ENCOUNTER (OUTPATIENT)
Facility: HOSPITAL | Age: 34
Setting detail: RECURRING SERIES
Discharge: HOME | End: 2024-07-21
Payer: COMMERCIAL

## 2024-07-18 PROCEDURE — 97535 SELF CARE MNGMENT TRAINING: CPT

## 2024-07-18 PROCEDURE — 97110 THERAPEUTIC EXERCISES: CPT

## 2024-07-18 PROCEDURE — 97112 NEUROMUSCULAR REEDUCATION: CPT

## 2024-07-18 PROCEDURE — 97530 THERAPEUTIC ACTIVITIES: CPT

## 2024-07-18 NOTE — PROGRESS NOTES
PHYSICAL / OCCUPATIONAL THERAPY - DAILY TREATMENT NOTE     Patient Name: Radha Born    Date: 2024    : 1990  Insurance: Payor: LEWIS / Plan: LEWIS RPN / Product Type: *No Product type* /      Patient  verified Yes     Visit #   Current / Total 2 8   Time   In / Out 12:31 1:06   Pain   In / Out 1-2 0   Subjective Functional Status/Changes: Patient reports having cervical check last Thursday and reports baby's head is very low  Patient will be 32 weeks pregnant tomorrow  Patient also recalls falling when 2 months postpartum with first child and tailbone pain back then   Changes to:  Allergies, Med Hx, Sx Hx?   no       TREATMENT AREA =  Other symptoms and signs involving the genitourinary system [R39.89]    OBJECTIVE      Therapeutic Procedures:  Tx Min Billable or 1:1 Min (if diff from Tx Min) Procedure, Rationale, Specifics   8  06155 Therapeutic Exercise (timed):  increase ROM, strength, coordination, balance, and proprioception to improve patient's ability to progress to PLOF and address remaining functional goals. (see flow sheet as applicable)    Details if applicable:       11  06988 Neuromuscular Re-Education (timed):  improve balance, coordination, kinesthetic sense, posture, core stability and proprioception to improve patient's ability to develop conscious control of individual muscles and awareness of position of extremities in order to progress to PLOF and address remaining functional goals. (see flow sheet as applicable)    Details if applicable:     8  36536 Therapeutic Activity (timed):  use of dynamic activities replicating functional movements to increase ROM, strength, coordination, balance, and proprioception in order to improve patient's ability to progress to PLOF and address remaining functional goals.  (see flow sheet as applicable)     Details if applicable:     8  88908 Self Care/Home Management (timed):  improve patient knowledge and understanding of pain reducing

## 2024-07-26 ENCOUNTER — HOSPITAL ENCOUNTER (OUTPATIENT)
Facility: HOSPITAL | Age: 34
Setting detail: RECURRING SERIES
Discharge: HOME | End: 2024-07-29
Payer: COMMERCIAL

## 2024-07-26 PROCEDURE — 97530 THERAPEUTIC ACTIVITIES: CPT

## 2024-07-26 PROCEDURE — 97112 NEUROMUSCULAR REEDUCATION: CPT

## 2024-07-26 PROCEDURE — 97535 SELF CARE MNGMENT TRAINING: CPT

## 2024-07-26 PROCEDURE — 97110 THERAPEUTIC EXERCISES: CPT

## 2024-07-26 NOTE — PROGRESS NOTES
health to aid in management of urinary urgency & incontinence.  Eval: Pt consuming insufficient amount of water  & unaware of bladder fitness, dietary irritants & urge suppression strategies.     Pt will report using strategies to reduce pubic symphysis pain to improve her ability to perform her job  Eval:  pt was aware of some strategies.  Patient was educated on more strategies.   7/18: reviewed with patient bed mobility to decrease pubic symphysis pain     Pt reports improvement in UI complaints evidenced by decrease in pad usage &/or amount of leakage by 50% to improve QOL.  Eval: Pt using 0-1 pads (panitliner) per day, generally damp (amt of leakage).      Pt will report reduced pain of pubic symphysis region to 5/10 at worst to allow her to perform her work with increased ease  Eval: 10/10 at worst  Current: 9/10 pain   7/26/2024, no significant change      Pt will report reduced pain of coccyx region to 4/10 at worst to allow her to perform her work with increased ease  Eval: 8-9/10 at worst  Current: 9/10 pain   7/26/2024, no significant change      Pt will report reduced pain of lumbar region to 4/10 at worst to allow her to perform  activities with increased ease  Eval: 8-9/10 at worst  Current: 9/10 pain   7/26/2024, no significant change      Pt will report reduced pain of suprapubic region to 4/10 at worst to allow her to perform  activities with increased ease  Eval: 9/10 at worst  Current: 9/10 pain   7/26/2024, no significant change      Pt will report voiding interval of 1 hours to improve QOL  Eval:   30 min      Long term goals: To be achieved in 8 treatments::  Pt reports bladder continence 75% of the time with cough/sneeze/laugh & walking to the toilet.   Eval: pt stress & urgency incontinence 1-3 times per day     Pt will report voiding interval of 1.5-2 hours to improve QOL  Eval:   30 min     Pt will report reduced pain of pubic symphysis region to 2/10 at worst to allow

## 2024-07-30 ENCOUNTER — TELEPHONE (OUTPATIENT)
Facility: HOSPITAL | Age: 34
End: 2024-07-30

## 2024-08-01 ENCOUNTER — APPOINTMENT (OUTPATIENT)
Facility: HOSPITAL | Age: 34
End: 2024-08-01
Payer: COMMERCIAL

## 2024-08-08 ENCOUNTER — APPOINTMENT (OUTPATIENT)
Facility: HOSPITAL | Age: 34
End: 2024-08-08
Payer: COMMERCIAL

## 2024-08-15 ENCOUNTER — HOSPITAL ENCOUNTER (OUTPATIENT)
Facility: HOSPITAL | Age: 34
Setting detail: RECURRING SERIES
Discharge: HOME | End: 2024-08-18
Payer: COMMERCIAL

## 2024-08-15 PROCEDURE — 97140 MANUAL THERAPY 1/> REGIONS: CPT

## 2024-08-15 PROCEDURE — 97110 THERAPEUTIC EXERCISES: CPT

## 2024-08-15 PROCEDURE — 97530 THERAPEUTIC ACTIVITIES: CPT

## 2024-08-15 NOTE — PROGRESS NOTES
PHYSICAL / OCCUPATIONAL THERAPY - DAILY TREATMENT NOTE     Patient Name: Radha Born    Date: 8/15/2024    : 1990  Insurance: Payor: LEWIS / Plan: LEWIS RPN / Product Type: *No Product type* /      Patient  verified Yes     Visit #   Current / Total 4 8   Time   In / Out 1150 1240   Pain   In / Out 2 0   Subjective Functional Status/Changes: Pt reports she will be induced at 39 weeks - approx 3 weeks.   Changes to:  Allergies, Med Hx, Sx Hx?   no       TREATMENT AREA =  Other symptoms and signs involving the genitourinary system [R39.89]    If an interpreting service is utilized for treatment of this patient, the contents of this document represent the material reviewed with the patient via the .     OBJECTIVE          Therapeutic Procedures:  Tx Min Billable or 1:1 Min (if diff from Tx Min) Procedure, Rationale, Specifics   15 15 03139 Manual Therapy (timed):  decrease pain, increase ROM, and increase tissue extensibility to improve patient's ability to progress to PLOF and address remaining functional goals.  The manual therapy interventions were performed at a separate and distinct time from the therapeutic activities interventions . Details: tripod for coccygeus, iliococcygeus; stuck drawer ; cpping for piriformis     Details if applicable:       10 10 81582 Therapeutic Activity (timed):  use of dynamic activities replicating functional movements to increase ROM, strength, coordination, balance, and proprioception in order to improve patient's ability to progress to PLOF and address remaining functional goals.  (see flow sheet as applicable)    Details if applicable:      79010 Therapeutic Exercise (timed):  increase ROM, strength, coordination, balance, and proprioception to improve patient's ability to progress to PLOF and address remaining functional goals. (see flow sheet as applicable)     Details if applicable:           Details if applicable:            Details if applicable:

## 2024-08-15 NOTE — PROGRESS NOTES
[facility]  [address] [place] [address]  Ph [phone]  Fx [phone]    Physical Therapy Progress Note  Patient name: Radha Flores Start of Care: 2024   Referral source: Hailee Tang APRN -* : 1990               Medical Diagnosis: Other symptoms and signs involving the genitourinary system [R39.89]    Onset Date:3/3/2024               Treatment Diagnosis: Other symptoms and signs involving the genitourinary system [R39.89]   Prior Hospitalization: see medical history Provider#: [id number]   Medications: Verified on Patient summary List      Comorbidities: 2 pregnancies/vaginal birth and currently 30 weeks  (due : 2024), umbilical hernia       Prior Level of Function: active lifestyle, No UI and no pubic symphysis pain     Visits from Start of Care: 4    Missed Visits: 1    Updated Goals/Measure of Progress: To be achieved in 4 treatments:    Short term goals: To be achieved in 4 treatments::  Pt demonstrates proper dietary/fluid habits & urge suppression strategies that promote bladder & bowel health to aid in management of urinary urgency & incontinence.  Eval: Pt consuming insufficient amount of water  & unaware of bladder fitness, dietary irritants & urge suppression strategies.  Current:  66 ounces (goal 70-72 ounces)  No change 8/15/2024     Pt will report using strategies to reduce pubic symphysis pain to improve her ability to perform her job  Eval:  pt was aware of some strategies.  Patient was educated on more strategies.   : reviewed with patient bed mobility to decrease pubic symphysis pain  PN:  pt reports trying to do the strategies.  GOAL MET 8/15/2024     Pt reports improvement in UI complaints evidenced by decrease in pad usage &/or amount of leakage by 50% to improve QOL.  Eval: Pt using 0-1 pads (panitliner) per day, generally damp (amt of leakage).   pN:  no leakage  Pt reports 50% improvement  GOAL MET 8/15/2024     Pt will report

## 2024-08-22 ENCOUNTER — HOSPITAL ENCOUNTER (OUTPATIENT)
Facility: HOSPITAL | Age: 34
Setting detail: RECURRING SERIES
Discharge: HOME | End: 2024-08-25
Payer: COMMERCIAL

## 2024-08-22 PROCEDURE — 97535 SELF CARE MNGMENT TRAINING: CPT

## 2024-08-22 PROCEDURE — 97112 NEUROMUSCULAR REEDUCATION: CPT

## 2024-08-22 PROCEDURE — 97110 THERAPEUTIC EXERCISES: CPT

## 2024-08-22 PROCEDURE — 97140 MANUAL THERAPY 1/> REGIONS: CPT

## 2024-08-22 NOTE — PROGRESS NOTES
PHYSICAL / OCCUPATIONAL THERAPY - DAILY TREATMENT NOTE (updated )    Patient Name: Radha Born    Date: 2024    : 1990  Insurance: Payor: LEWIS / Plan: LEWIS RPN / Product Type: *No Product type* /      Patient  verified YES   Visit #   Current / Total 5 8   Time   In / Out 11:56 12:37   Pain   In / Out 5-6 5   Subjective Functional Status/Changes: Patient had OB check this mornincm dilated and 50% effaced. Not feeling great. 37 weeks tomorrow and having Siletz-Roberts.     Reports improvement in tailbone pain from previous session's interventions.   Changes to:  Meds, Allergies, Med Hx, Sx Hx?  If yes, update Summary List YES/NO     TREATMENT AREA =  Other symptoms and signs involving the genitourinary system [R39.89]    OBJECTIVE           Therapeutic Procedures:    Tx Min Billable or 1:1 Min (if diff from Tx Min) Procedure, Rationale, Specifics   12  22492 Neuromuscular Re-Education (timed):  improve balance, coordination, kinesthetic sense, posture, core stability and proprioception to improve patient's ability to develop conscious control of individual muscles and awareness of position of extremities in order to progress to PLOF and address remaining functional goals. (see flow sheet as applicable)     Details if applicable:    []  Pelvic floor strengthening                 []  Pelvic floor downtraining  []  Quality pelvic floor contractions       []  Relaxation techniques  []  Urge suppression exercises  []  Other: TA coordination        8  93162 Self Care/Home Management (timed):  improve patient knowledge and understanding of pain reducing techniques, positioning, and posture/ergonomics  to improve patient's ability to progress to PLOF and address remaining functional goals.  (see flow sheet as applicable)     Details if applicable:    []  Pelvic floor strengthening                 []  Pelvic floor downtraining  []  Quality pelvic floor contractions       []  Relaxation techniques  []

## 2024-08-24 ENCOUNTER — ANESTHESIA EVENT (OUTPATIENT)
Facility: HOSPITAL | Age: 34
End: 2024-08-24
Payer: COMMERCIAL

## 2024-08-24 ENCOUNTER — ANESTHESIA (OUTPATIENT)
Facility: HOSPITAL | Age: 34
End: 2024-08-24
Payer: COMMERCIAL

## 2024-08-24 ENCOUNTER — HOSPITAL ENCOUNTER (INPATIENT)
Facility: HOSPITAL | Age: 34
LOS: 1 days | Discharge: HOME | End: 2024-08-25
Attending: OBSTETRICS & GYNECOLOGY | Admitting: OBSTETRICS & GYNECOLOGY
Payer: COMMERCIAL

## 2024-08-24 PROBLEM — O47.9 UTERINE CONTRACTIONS: Status: ACTIVE | Noted: 2024-08-24

## 2024-08-24 LAB
ABO + RH BLD: NORMAL
APPEARANCE UR: CLEAR
BASOPHILS # BLD: 0 K/UL (ref 0–0.1)
BASOPHILS NFR BLD: 0 % (ref 0–2)
BILIRUB UR QL: NEGATIVE
BLOOD GROUP ANTIBODIES SERPL: NORMAL
COLOR UR: NORMAL
DIFFERENTIAL METHOD BLD: ABNORMAL
EOSINOPHIL # BLD: 0.1 K/UL (ref 0–0.4)
EOSINOPHIL NFR BLD: 1 % (ref 0–5)
ERYTHROCYTE [DISTWIDTH] IN BLOOD BY AUTOMATED COUNT: 13.7 % (ref 11.6–14.5)
GLUCOSE UR QL STRIP.AUTO: NEGATIVE MG/DL
HCT VFR BLD AUTO: 34.2 % (ref 35–45)
HGB BLD-MCNC: 11 G/DL (ref 12–16)
IMM GRANULOCYTES # BLD AUTO: 0.1 K/UL (ref 0–0.04)
IMM GRANULOCYTES NFR BLD AUTO: 1 % (ref 0–0.5)
KETONES UR-MCNC: NEGATIVE MG/DL
LEUKOCYTE ESTERASE UR QL STRIP: NEGATIVE
LYMPHOCYTES # BLD: 1.5 K/UL (ref 0.9–3.6)
LYMPHOCYTES NFR BLD: 14 % (ref 21–52)
MCH RBC QN AUTO: 26.6 PG (ref 24–34)
MCHC RBC AUTO-ENTMCNC: 32.2 G/DL (ref 31–37)
MCV RBC AUTO: 82.6 FL (ref 78–100)
MONOCYTES # BLD: 0.7 K/UL (ref 0.05–1.2)
MONOCYTES NFR BLD: 7 % (ref 3–10)
NEUTS SEG # BLD: 7.9 K/UL (ref 1.8–8)
NEUTS SEG NFR BLD: 77 % (ref 40–73)
NITRITE UR QL: NEGATIVE
NRBC # BLD: 0 K/UL (ref 0–0.01)
NRBC BLD-RTO: 0 PER 100 WBC
PH UR: 6.5 (ref 5–9)
PLATELET # BLD AUTO: 255 K/UL (ref 135–420)
PMV BLD AUTO: 10.1 FL (ref 9.2–11.8)
PROT UR QL: NEGATIVE MG/DL
RBC # BLD AUTO: 4.14 M/UL (ref 4.2–5.3)
RBC # UR STRIP: NEGATIVE
SERVICE CMNT-IMP: NORMAL
SP GR UR: 1.01 (ref 1–1.02)
SPECIMEN EXP DATE BLD: NORMAL
UROBILINOGEN UR QL: 0.2 EU/DL (ref 0.2–1)
WBC # BLD AUTO: 10.3 K/UL (ref 4.6–13.2)

## 2024-08-24 PROCEDURE — 6360000002 HC RX W HCPCS: Performed by: ADVANCED PRACTICE MIDWIFE

## 2024-08-24 PROCEDURE — 3700000156 HC EPIDURAL ANESTHESIA

## 2024-08-24 PROCEDURE — 2500000003 HC RX 250 WO HCPCS: Performed by: NURSE ANESTHETIST, CERTIFIED REGISTERED

## 2024-08-24 PROCEDURE — 99283 EMERGENCY DEPT VISIT LOW MDM: CPT

## 2024-08-24 PROCEDURE — 3700000025 EPIDURAL BLOCK: Performed by: NURSE ANESTHETIST, CERTIFIED REGISTERED

## 2024-08-24 PROCEDURE — 86900 BLOOD TYPING SEROLOGIC ABO: CPT

## 2024-08-24 PROCEDURE — 86901 BLOOD TYPING SEROLOGIC RH(D): CPT

## 2024-08-24 PROCEDURE — 6370000000 HC RX 637 (ALT 250 FOR IP): Performed by: ADVANCED PRACTICE MIDWIFE

## 2024-08-24 PROCEDURE — 7220000101 HC DELIVERY VAGINAL/SINGLE

## 2024-08-24 PROCEDURE — 85025 COMPLETE CBC W/AUTO DIFF WBC: CPT

## 2024-08-24 PROCEDURE — 59025 FETAL NON-STRESS TEST: CPT

## 2024-08-24 PROCEDURE — 7210000100 HC LABOR FEE PER 1 HR

## 2024-08-24 PROCEDURE — 51702 INSERT TEMP BLADDER CATH: CPT

## 2024-08-24 PROCEDURE — 81003 URINALYSIS AUTO W/O SCOPE: CPT

## 2024-08-24 PROCEDURE — 86592 SYPHILIS TEST NON-TREP QUAL: CPT

## 2024-08-24 PROCEDURE — 00HU33Z INSERTION OF INFUSION DEVICE INTO SPINAL CANAL, PERCUTANEOUS APPROACH: ICD-10-PCS | Performed by: NURSE ANESTHETIST, CERTIFIED REGISTERED

## 2024-08-24 PROCEDURE — 2580000003 HC RX 258: Performed by: ADVANCED PRACTICE MIDWIFE

## 2024-08-24 PROCEDURE — 7100000000 HC PACU RECOVERY - FIRST 15 MIN

## 2024-08-24 PROCEDURE — 86850 RBC ANTIBODY SCREEN: CPT

## 2024-08-24 PROCEDURE — 1100000000 HC RM PRIVATE

## 2024-08-24 PROCEDURE — 10907ZC DRAINAGE OF AMNIOTIC FLUID, THERAPEUTIC FROM PRODUCTS OF CONCEPTION, VIA NATURAL OR ARTIFICIAL OPENING: ICD-10-PCS | Performed by: OBSTETRICS & GYNECOLOGY

## 2024-08-24 PROCEDURE — 7100000001 HC PACU RECOVERY - ADDTL 15 MIN

## 2024-08-24 RX ORDER — MISOPROSTOL 200 UG/1
200 TABLET ORAL PRN
Status: DISCONTINUED | OUTPATIENT
Start: 2024-08-24 | End: 2024-08-25 | Stop reason: HOSPADM

## 2024-08-24 RX ORDER — SODIUM CHLORIDE, SODIUM LACTATE, POTASSIUM CHLORIDE, CALCIUM CHLORIDE 600; 310; 30; 20 MG/100ML; MG/100ML; MG/100ML; MG/100ML
INJECTION, SOLUTION INTRAVENOUS CONTINUOUS
Status: DISCONTINUED | OUTPATIENT
Start: 2024-08-24 | End: 2024-08-24 | Stop reason: HOSPADM

## 2024-08-24 RX ORDER — DIPHENHYDRAMINE HYDROCHLORIDE 50 MG/ML
25 INJECTION INTRAMUSCULAR; INTRAVENOUS EVERY 6 HOURS PRN
Status: DISCONTINUED | OUTPATIENT
Start: 2024-08-24 | End: 2024-08-24 | Stop reason: HOSPADM

## 2024-08-24 RX ORDER — HYDROMORPHONE HYDROCHLORIDE 1 MG/ML
1 INJECTION, SOLUTION INTRAMUSCULAR; INTRAVENOUS; SUBCUTANEOUS EVERY 4 HOURS PRN
Status: DISCONTINUED | OUTPATIENT
Start: 2024-08-24 | End: 2024-08-24 | Stop reason: HOSPADM

## 2024-08-24 RX ORDER — FENTANYL/ROPIVACAINE/NS/PF 2MCG/ML-.2
9 PLASTIC BAG, INJECTION (ML) INJECTION CONTINUOUS
Status: DISCONTINUED | OUTPATIENT
Start: 2024-08-24 | End: 2024-08-24 | Stop reason: HOSPADM

## 2024-08-24 RX ORDER — SEVOFLURANE 250 ML/250ML
1 LIQUID RESPIRATORY (INHALATION) CONTINUOUS PRN
Status: DISCONTINUED | OUTPATIENT
Start: 2024-08-24 | End: 2024-08-24 | Stop reason: HOSPADM

## 2024-08-24 RX ORDER — SODIUM CHLORIDE, SODIUM LACTATE, POTASSIUM CHLORIDE, AND CALCIUM CHLORIDE .6; .31; .03; .02 G/100ML; G/100ML; G/100ML; G/100ML
500 INJECTION, SOLUTION INTRAVENOUS PRN
Status: DISCONTINUED | OUTPATIENT
Start: 2024-08-24 | End: 2024-08-24 | Stop reason: HOSPADM

## 2024-08-24 RX ORDER — SODIUM CHLORIDE 0.9 % (FLUSH) 0.9 %
5-40 SYRINGE (ML) INJECTION PRN
Status: DISCONTINUED | OUTPATIENT
Start: 2024-08-24 | End: 2024-08-24 | Stop reason: HOSPADM

## 2024-08-24 RX ORDER — TRANEXAMIC ACID 10 MG/ML
1000 INJECTION, SOLUTION INTRAVENOUS
Status: DISPENSED | OUTPATIENT
Start: 2024-08-24 | End: 2024-08-25

## 2024-08-24 RX ORDER — MISOPROSTOL 200 UG/1
400 TABLET ORAL PRN
Status: DISCONTINUED | OUTPATIENT
Start: 2024-08-24 | End: 2024-08-25 | Stop reason: HOSPADM

## 2024-08-24 RX ORDER — SODIUM CHLORIDE 0.9 % (FLUSH) 0.9 %
5-40 SYRINGE (ML) INJECTION EVERY 12 HOURS SCHEDULED
Status: DISCONTINUED | OUTPATIENT
Start: 2024-08-24 | End: 2024-08-25 | Stop reason: HOSPADM

## 2024-08-24 RX ORDER — SODIUM CHLORIDE, SODIUM LACTATE, POTASSIUM CHLORIDE, AND CALCIUM CHLORIDE .6; .31; .03; .02 G/100ML; G/100ML; G/100ML; G/100ML
500 INJECTION, SOLUTION INTRAVENOUS ONCE
Status: DISCONTINUED | OUTPATIENT
Start: 2024-08-24 | End: 2024-08-24 | Stop reason: HOSPADM

## 2024-08-24 RX ORDER — ONDANSETRON 2 MG/ML
4 INJECTION INTRAMUSCULAR; INTRAVENOUS EVERY 6 HOURS PRN
Status: DISCONTINUED | OUTPATIENT
Start: 2024-08-24 | End: 2024-08-25 | Stop reason: HOSPADM

## 2024-08-24 RX ORDER — LIDOCAINE HYDROCHLORIDE 10 MG/ML
30 INJECTION, SOLUTION EPIDURAL; INFILTRATION; INTRACAUDAL; PERINEURAL PRN
Status: DISCONTINUED | OUTPATIENT
Start: 2024-08-24 | End: 2024-08-25 | Stop reason: HOSPADM

## 2024-08-24 RX ORDER — IBUPROFEN 400 MG/1
800 TABLET, FILM COATED ORAL EVERY 8 HOURS SCHEDULED
Status: DISCONTINUED | OUTPATIENT
Start: 2024-08-24 | End: 2024-08-25 | Stop reason: HOSPADM

## 2024-08-24 RX ORDER — DIPHENHYDRAMINE HCL 25 MG
25 CAPSULE ORAL EVERY 6 HOURS PRN
Status: DISCONTINUED | OUTPATIENT
Start: 2024-08-24 | End: 2024-08-24 | Stop reason: HOSPADM

## 2024-08-24 RX ORDER — LANOLIN/MINERAL OIL
LOTION (ML) TOPICAL PRN
Status: DISCONTINUED | OUTPATIENT
Start: 2024-08-24 | End: 2024-08-25 | Stop reason: HOSPADM

## 2024-08-24 RX ORDER — TERBUTALINE SULFATE 1 MG/ML
0.25 INJECTION, SOLUTION SUBCUTANEOUS
Status: DISCONTINUED | OUTPATIENT
Start: 2024-08-24 | End: 2024-08-24 | Stop reason: HOSPADM

## 2024-08-24 RX ORDER — HYDROMORPHONE HYDROCHLORIDE 2 MG/1
1 TABLET ORAL EVERY 4 HOURS PRN
Status: DISCONTINUED | OUTPATIENT
Start: 2024-08-24 | End: 2024-08-25 | Stop reason: HOSPADM

## 2024-08-24 RX ORDER — ACETAMINOPHEN 500 MG
1000 TABLET ORAL EVERY 8 HOURS SCHEDULED
Status: DISCONTINUED | OUTPATIENT
Start: 2024-08-24 | End: 2024-08-25 | Stop reason: HOSPADM

## 2024-08-24 RX ORDER — NALOXONE HYDROCHLORIDE 0.4 MG/ML
INJECTION, SOLUTION INTRAMUSCULAR; INTRAVENOUS; SUBCUTANEOUS PRN
Status: DISCONTINUED | OUTPATIENT
Start: 2024-08-24 | End: 2024-08-24 | Stop reason: HOSPADM

## 2024-08-24 RX ORDER — LIDOCAINE HCL/EPINEPHRINE/PF 2%-1:200K
VIAL (ML) INJECTION PRN
Status: DISCONTINUED | OUTPATIENT
Start: 2024-08-24 | End: 2024-08-24 | Stop reason: SDUPTHER

## 2024-08-24 RX ORDER — DOCUSATE SODIUM 100 MG/1
100 CAPSULE, LIQUID FILLED ORAL 2 TIMES DAILY
Status: DISCONTINUED | OUTPATIENT
Start: 2024-08-24 | End: 2024-08-25 | Stop reason: HOSPADM

## 2024-08-24 RX ORDER — EPHEDRINE SULFATE/0.9% NACL/PF 50 MG/5 ML
10 SYRINGE (ML) INTRAVENOUS AS NEEDED
Status: DISCONTINUED | OUTPATIENT
Start: 2024-08-24 | End: 2024-08-24 | Stop reason: HOSPADM

## 2024-08-24 RX ORDER — CARBOPROST TROMETHAMINE 250 UG/ML
250 INJECTION, SOLUTION INTRAMUSCULAR PRN
Status: DISCONTINUED | OUTPATIENT
Start: 2024-08-24 | End: 2024-08-24 | Stop reason: HOSPADM

## 2024-08-24 RX ORDER — SODIUM CHLORIDE 0.9 % (FLUSH) 0.9 %
5-40 SYRINGE (ML) INJECTION EVERY 12 HOURS SCHEDULED
Status: DISCONTINUED | OUTPATIENT
Start: 2024-08-24 | End: 2024-08-24 | Stop reason: HOSPADM

## 2024-08-24 RX ORDER — ONDANSETRON 4 MG/1
4 TABLET, ORALLY DISINTEGRATING ORAL EVERY 6 HOURS PRN
Status: DISCONTINUED | OUTPATIENT
Start: 2024-08-24 | End: 2024-08-25 | Stop reason: HOSPADM

## 2024-08-24 RX ORDER — METHYLERGONOVINE MALEATE 0.2 MG/ML
200 INJECTION INTRAVENOUS PRN
Status: DISCONTINUED | OUTPATIENT
Start: 2024-08-24 | End: 2024-08-24 | Stop reason: HOSPADM

## 2024-08-24 RX ORDER — SODIUM CHLORIDE 9 MG/ML
INJECTION, SOLUTION INTRAVENOUS PRN
Status: DISCONTINUED | OUTPATIENT
Start: 2024-08-24 | End: 2024-08-24 | Stop reason: HOSPADM

## 2024-08-24 RX ORDER — SODIUM CHLORIDE 0.9 % (FLUSH) 0.9 %
5-40 SYRINGE (ML) INJECTION PRN
Status: DISCONTINUED | OUTPATIENT
Start: 2024-08-24 | End: 2024-08-25 | Stop reason: HOSPADM

## 2024-08-24 RX ORDER — ACETAMINOPHEN 325 MG/1
650 TABLET ORAL EVERY 4 HOURS PRN
Status: DISCONTINUED | OUTPATIENT
Start: 2024-08-24 | End: 2024-08-24 | Stop reason: HOSPADM

## 2024-08-24 RX ORDER — SODIUM CHLORIDE 9 MG/ML
INJECTION, SOLUTION INTRAVENOUS PRN
Status: DISCONTINUED | OUTPATIENT
Start: 2024-08-24 | End: 2024-08-25 | Stop reason: HOSPADM

## 2024-08-24 RX ORDER — SODIUM CHLORIDE, SODIUM LACTATE, POTASSIUM CHLORIDE, AND CALCIUM CHLORIDE .6; .31; .03; .02 G/100ML; G/100ML; G/100ML; G/100ML
1000 INJECTION, SOLUTION INTRAVENOUS PRN
Status: DISCONTINUED | OUTPATIENT
Start: 2024-08-24 | End: 2024-08-24 | Stop reason: HOSPADM

## 2024-08-24 RX ORDER — TRANEXAMIC ACID 10 MG/ML
1000 INJECTION, SOLUTION INTRAVENOUS
Status: DISCONTINUED | OUTPATIENT
Start: 2024-08-24 | End: 2024-08-24 | Stop reason: HOSPADM

## 2024-08-24 RX ADMIN — LIDOCAINE HYDROCHLORIDE,EPINEPHRINE BITARTRATE 2 ML: 20; .005 INJECTION, SOLUTION EPIDURAL; INFILTRATION; INTRACAUDAL; PERINEURAL at 07:25

## 2024-08-24 RX ADMIN — SODIUM CHLORIDE, POTASSIUM CHLORIDE, SODIUM LACTATE AND CALCIUM CHLORIDE: 600; 310; 30; 20 INJECTION, SOLUTION INTRAVENOUS at 07:18

## 2024-08-24 RX ADMIN — SODIUM CHLORIDE, POTASSIUM CHLORIDE, SODIUM LACTATE AND CALCIUM CHLORIDE 1000 ML: 600; 310; 30; 20 INJECTION, SOLUTION INTRAVENOUS at 06:19

## 2024-08-24 RX ADMIN — Medication 166.7 ML: at 11:41

## 2024-08-24 RX ADMIN — ACETAMINOPHEN 1000 MG: 500 TABLET ORAL at 21:01

## 2024-08-24 RX ADMIN — LIDOCAINE HYDROCHLORIDE,EPINEPHRINE BITARTRATE 3 ML: 20; .005 INJECTION, SOLUTION EPIDURAL; INFILTRATION; INTRACAUDAL; PERINEURAL at 07:21

## 2024-08-24 RX ADMIN — OXYTOCIN 6 MILLI-UNITS/MIN: 10 INJECTION, SOLUTION INTRAMUSCULAR; INTRAVENOUS at 09:12

## 2024-08-24 RX ADMIN — IBUPROFEN 800 MG: 400 TABLET, FILM COATED ORAL at 18:17

## 2024-08-24 RX ADMIN — IBUPROFEN 800 MG: 400 TABLET, FILM COATED ORAL at 21:00

## 2024-08-24 RX ADMIN — ACETAMINOPHEN 1000 MG: 500 TABLET ORAL at 13:33

## 2024-08-24 RX ADMIN — DOCUSATE SODIUM 100 MG: 100 CAPSULE, LIQUID FILLED ORAL at 21:01

## 2024-08-24 RX ADMIN — DOCUSATE SODIUM 100 MG: 100 CAPSULE, LIQUID FILLED ORAL at 18:16

## 2024-08-24 RX ADMIN — Medication 9 ML/HR: at 07:22

## 2024-08-24 ASSESSMENT — PAIN DESCRIPTION - ORIENTATION
ORIENTATION: MID
ORIENTATION: LOWER
ORIENTATION: MID

## 2024-08-24 ASSESSMENT — PAIN DESCRIPTION - DESCRIPTORS
DESCRIPTORS: CRAMPING
DESCRIPTORS: ACHING
DESCRIPTORS: SORE

## 2024-08-24 ASSESSMENT — PAIN - FUNCTIONAL ASSESSMENT
PAIN_FUNCTIONAL_ASSESSMENT: ACTIVITIES ARE NOT PREVENTED

## 2024-08-24 ASSESSMENT — PAIN DESCRIPTION - LOCATION
LOCATION: HEAD
LOCATION: ABDOMEN
LOCATION: ABDOMEN

## 2024-08-24 ASSESSMENT — PAIN SCALES - GENERAL
PAINLEVEL_OUTOF10: 7
PAINLEVEL_OUTOF10: 3
PAINLEVEL_OUTOF10: 2

## 2024-08-24 NOTE — H&P
History & Physical    Name: Radha Born MRN: [id number]  SSN: [id number]    YOB: 1990  Age: 34 y.o.  Sex: female        Subjective:     Estimated Date of Delivery: 24  OB History    Para Term  AB Living   2   1     1   SAB IAB Ectopic Molar Multiple Live Births                    # Outcome Date GA Lbr Woody/2nd Weight Sex Type Anes PTL Lv   1 Current                Radha Born is 34 y.o., , at 37w1d presenting to L&D for active labor. Prenatal course has been complicated by mild anemia, anxiety- no meds, AMA. Her GBS is pending, status unknown. Please see prenatal records for details.    Past Medical History:   Diagnosis Date    Anemia     Ill-defined condition     PFO    Ill-defined condition     migraines    Patent foramen ovale      Past Surgical History:   Procedure Laterality Date    GI      upper and lower endoscopy    HEENT      wisom teeth     Social History     Occupational History    Not on file   Tobacco Use    Smoking status: Never    Smokeless tobacco: Never   Vaping Use    Vaping status: Never Used   Substance and Sexual Activity    Alcohol use: No    Drug use: No    Sexual activity: Not on file     Family History   Problem Relation Age of Onset    Cancer Mother     Hypertension Mother     Asthma Father     Heart Disease Father     Diabetes Maternal Grandmother     Stroke Paternal Grandfather     Heart Disease Paternal Grandfather     Stroke Paternal Grandmother     Diabetes Maternal Grandfather     Hypertension Father        Allergies   Allergen Reactions    Sulfa Antibiotics Itching    Naproxen Anxiety     Prior to Admission medications    Not on File        Review of Systems: Pertinent items are noted in HPI.    Objective:     Vitals:  Vitals:    24 0221 24 0235 24 0237 24 0240   BP: 108/66  109/60    Pulse: 83 97 88    SpO2:  98%  99%        Physical Exam:  Patient without distress.  Heart: Regular rate and rhythm  Lung: normal

## 2024-08-24 NOTE — PROGRESS NOTES
0215 Received to triage with c/o contractions. 37 + 1 weeks . Assisted to triage #1.     0603 Transferred to BR #4.

## 2024-08-24 NOTE — ANESTHESIA PRE PROCEDURE
Department of Anesthesiology  Preprocedure Note       Name:  Radha Born   Age:  34 y.o.  :  1990                                          MRN:  [id number]         Date:  2024      Surgeon: * No surgeons listed *    Procedure: * No procedures listed *    Medications prior to admission:   Prior to Admission medications    Not on File       Current medications:    Current Facility-Administered Medications   Medication Dose Route Frequency Provider Last Rate Last Admin   • terbutaline (BRETHINE) injection 0.25 mg  0.25 mg SubCUTAneous Once PRN Geeta Tai APRN - CNM       • lactated ringers IV soln infusion   IntraVENous Continuous Geeta Tai APRN -  mL/hr at 24 0718 New Bag at 24 0718   • lactated ringers bolus 500 mL  500 mL IntraVENous Once Geeta Tai APRN - CNM       • lactated ringers bolus 500 mL  500 mL IntraVENous PRN Geeta Tai APRN - CNM        Or   • lactated ringers bolus 1,000 mL  1,000 mL IntraVENous PRN Geeta Tai APRN - .9 mL/hr at 24 0619 1,000 mL at 24 0619   • sodium chloride flush 0.9 % injection 5-40 mL  5-40 mL IntraVENous 2 times per day Geeta Tai APRN - CNM       • sodium chloride flush 0.9 % injection 5-40 mL  5-40 mL IntraVENous PRN Geeta Tai APRN - CNM       • 0.9 % sodium chloride infusion   IntraVENous PRN Geeta Tai APRN - CNM       • lidocaine PF 1 % injection 30 mL  30 mL Other PRN Geeta Tai APRN - CNM       • nitrous oxide 50% inhalation 1 each  1 each Inhalation Continuous PRN Geeta Tai APRN - CNM       • ondansetron (ZOFRAN) injection 4 mg  4 mg IntraVENous Q6H PRN Geeta Tai APRN - CNM        Or   • ondansetron (ZOFRAN-ODT) disintegrating tablet 4 mg  4 mg Oral Q6H PRN Geeta Tai APRN - CNM       • oxytocin (PITOCIN) 30 units in 500 mL infusion  87.3 chacho-units/min IntraVENous Continuous PRN Geeta Tai APRN - CNM        And   •

## 2024-08-24 NOTE — L&D DELIVERY NOTE
Presented to L&D in active labor. After receiving an epidural for pain control, she was AROM'd for labor augmentation. Her contractions spaced out and pitocin was ordered. Once pitocin was started her contractions became regular and strong. Patient demonstrated excellent second stage efforts. Infant was born over an intact perineum in the ASHLEY position at 1134. Infant was lifted to maternal abdomen by patient, lust cries noted. Cord clamping delayed x3min. Placenta delivered intact via [place] at 1144. Fundus firm and midline, QBL: 190mls. Infant remained skin to skin, strong bonding noted.     RACHNA Blanco CNM  2024        Born, Epifanio Garcia [[id number]]      Labor Events     Labor: No  Cervical Ripening Date/Time:      Antibiotics Received during Labor: No  Rupture Date/Time:  24 08:00:00   Rupture Type: AROM  Fluid Color: Clear  Fluid Odor: None  Fluid Volume: Moderate  Augmentation: Oxytocin  Labor Complications: None       Anesthesia    Method: Epidural       Delivery Details      Delivery Date: 24 Delivery Time: 11:34:00   Delivery Type: Vaginal, Spontaneous               Presentation    Presentation: Vertex  Position: Left  _: Occiput  _: Anterior       Shoulder Dystocia    Shoulder Dystocia Present?: No       Assisted Delivery Details    Forceps Attempted?: No  Vacuum Extractor Attempted?: No                           Cord    Vessels: 3 Vessels  Complications: None  Delayed Cord Clamping?: Yes  Cord Clamped Date/Time: 2024 11:37:00  Cord Blood Disposition: Lab  Gases Sent?: No              Placenta    Removal: Spontaneous  Appearance: Intact  Disposition: Discarded       Lacerations    Episiotomy: None  Perineal Lacerations: None  Other Lacerations: no non-perineal laceration       Vaginal Counts    Initial Count Personnel: REJI CHANG  Initial Count Verified By: ROSE LAFLEUR  Intial Sponge Count: Correct Intial Needles Count: Correct Intial Instruments Count: Correct

## 2024-08-24 NOTE — PLAN OF CARE
Problem: Pain  Goal: Verbalizes/displays adequate comfort level or baseline comfort level  2024 by Evelina Gottlieb RN  Outcome: Progressing  Flowsheets (Taken 2024 1805)  Verbalizes/displays adequate comfort level or baseline comfort level:   Encourage patient to monitor pain and request assistance   Assess pain using appropriate pain scale  2024 by Lula Doherty RN  Outcome: Progressing     Problem: Postpartum  Goal: Experiences normal postpartum course  Description:  Postpartum OB-Pregnancy care plan goal which identifies if the mother is experiencing a normal postpartum course  2024 by Evelina Gottlieb RN  Outcome: Progressing  2024 by Lula Doherty RN  Outcome: Progressing  Goal: Appropriate maternal -  bonding  Description:  Postpartum OB-Pregnancy care plan goal which identifies if the mother and  are bonding appropriately  2024 by Evelina Gottlieb RN  Outcome: Progressing  2024 by Lula Doherty RN  Outcome: Progressing  Goal: Establishment of infant feeding pattern  Description:  Postpartum OB-Pregnancy care plan goal which identifies if the mother is establishing a feeding pattern with their   2024 by Evelina Gottlieb RN  Outcome: Progressing  2024 by Lula Doherty RN  Outcome: Progressing  Goal: Incisions, wounds, or drain sites healing without S/S of infection  Outcome: Progressing  Flowsheets (Taken 2024 1805)  Incisions, Wounds, or Drain Sites Healing Without Sign and Symptoms of Infection: TWICE DAILY: Assess and document skin integrity     Problem: Infection - Adult  Goal: Absence of infection at discharge  2024 by Evelina Gottlieb RN  Outcome: Progressing  2024 by Lula Doherty RN  Outcome: Progressing  Goal: Absence of infection during hospitalization  2024 by Evelina Gottlieb RN  Outcome: Progressing  2024 by Lula Doherty  RN  Outcome: Progressing  Goal: Absence of fever/infection during anticipated neutropenic period  8/24/2024 1828 by Evelina Gottlieb RN  Outcome: Progressing  8/24/2024 1427 by Lula Doherty RN  Outcome: Progressing     Problem: Safety - Adult  Goal: Free from fall injury  8/24/2024 1828 by Evelina Gottlieb RN  Outcome: Progressing  8/24/2024 1427 by Lula Doherty RN  Outcome: Progressing     Problem: Discharge Planning  Goal: Discharge to home or other facility with appropriate resources  Outcome: Progressing

## 2024-08-24 NOTE — PLAN OF CARE
Problem: Pain  Goal: Verbalizes/displays adequate comfort level or baseline comfort level  Outcome: Progressing     Problem: Vaginal Birth or  Section  Goal: Fetal and maternal status remain reassuring during the birth process  Description:  Birth OB-Pregnancy care plan goal which identifies if the fetal and maternal status remain reassuring during the birth process  Outcome: Completed     Problem: Postpartum  Goal: Experiences normal postpartum course  Description:  Postpartum OB-Pregnancy care plan goal which identifies if the mother is experiencing a normal postpartum course  Outcome: Progressing  Goal: Appropriate maternal -  bonding  Description:  Postpartum OB-Pregnancy care plan goal which identifies if the mother and  are bonding appropriately  Outcome: Progressing  Goal: Establishment of infant feeding pattern  Description:  Postpartum OB-Pregnancy care plan goal which identifies if the mother is establishing a feeding pattern with their   Outcome: Progressing     Problem: Infection - Adult  Goal: Absence of infection at discharge  Outcome: Progressing  Goal: Absence of infection during hospitalization  Outcome: Progressing  Goal: Absence of fever/infection during anticipated neutropenic period  Outcome: Progressing     Problem: Safety - Adult  Goal: Free from fall injury  Outcome: Progressing

## 2024-08-24 NOTE — PROGRESS NOTES
Labor Progress Note    States she is feeling more comfortable with her epidural in place.      Physical Exam:  BP (!) 111/58   Pulse 90   Temp 97.8 °F (36.6 °C) (Oral)   Resp 16   Ht 1.575 m (5' 2\")   SpO2 98%   BMI 27.80 kg/m²    Cervical Exam:  /-1, cephalic, AROM moderate amount clear fluid.  Uterine Activity: 3-4min  Fetal Heart Rate: Reactive    Assessment/Plan:  AROM augmentation of labor  Continue to monitor labor progress and fetal wellbeing  Anticipate     RACHNA Blanco, CNM  2024

## 2024-08-24 NOTE — ANESTHESIA PROCEDURE NOTES
Epidural Block    Patient location during procedure: OB  Start time: 8/24/2024 7:19 AM  End time: 8/24/2024 7:20 AM  Reason for block: labor epidural  Staffing  Performed: resident/CRNA   Resident/CRNA: Cielo Stringer APRN - CRNA  Performed by: Cielo Stringer APRN - CRNA  Authorized by: Cielo Stringer APRN - CRNA    Epidural  Patient position: sitting  Prep: ChloraPrep  Patient monitoring: cardiac monitor, continuous pulse ox and frequent blood pressure checks  Approach: midline  Location: L2-3  Injection technique: HUBERT saline  Provider prep: mask and sterile gloves  Needle  Needle type: Tuohy   Needle gauge: 17 G  Needle length: 6 in  Needle insertion depth: 6 cm  Catheter type: end hole  Catheter size: 19 G  Catheter at skin depth: 12 cm  Test dose: negativeCatheter Secured: tape and tegaderm  Assessment  Sensory level: T6  Hemodynamics: stable  Attempts: 1  Outcomes: uncomplicated and patient tolerated procedure well  Preanesthetic Checklist  Completed: patient identified, IV checked, site marked, risks and benefits discussed, surgical/procedural consents, equipment checked, pre-op evaluation, timeout performed, anesthesia consent given, oxygen available, monitors applied/VS acknowledged, fire risk safety assessment completed and verbalized and blood product R/B/A discussed and consented

## 2024-08-24 NOTE — PROGRESS NOTES
0708- SAHIL Stringer at bedside for epidural placement. Procedure explained to include risks and benefits. Verbalizes understanding. All questions answered.      Sitting up on side of bed. Position reviewed.     Time out 0717     Epidural placed 0720     Test dose 0721     Loading dosed.      Assisted back to bed after epidural placement. Tolerated procedure well. Vital signs stable. Monitors adjusted.      0745- CNM Zaire at bedside. AROM clear moderate fluids.     0900- CNM Zaire at bedside. SVE unchanged. Orders given to start Pitocin at 6 chacho-units/min.    1125- CNM Zaire at bedside.   1130- Pt began pushing     Time of birth 1134  Placenta  1141    Amniotic 300

## 2024-08-25 VITALS
RESPIRATION RATE: 18 BRPM | BODY MASS INDEX: 27.8 KG/M2 | TEMPERATURE: 98.1 F | HEART RATE: 85 BPM | SYSTOLIC BLOOD PRESSURE: 99 MMHG | DIASTOLIC BLOOD PRESSURE: 50 MMHG | OXYGEN SATURATION: 100 % | HEIGHT: 62 IN

## 2024-08-25 LAB
HCT VFR BLD AUTO: 28.9 % (ref 35–45)
HGB BLD-MCNC: 9.2 G/DL (ref 12–16)

## 2024-08-25 PROCEDURE — 85014 HEMATOCRIT: CPT

## 2024-08-25 PROCEDURE — 36415 COLL VENOUS BLD VENIPUNCTURE: CPT

## 2024-08-25 PROCEDURE — 6370000000 HC RX 637 (ALT 250 FOR IP): Performed by: ADVANCED PRACTICE MIDWIFE

## 2024-08-25 PROCEDURE — 85018 HEMOGLOBIN: CPT

## 2024-08-25 RX ORDER — FERROUS SULFATE 325(65) MG
325 TABLET ORAL 2 TIMES DAILY WITH MEALS
Status: DISCONTINUED | OUTPATIENT
Start: 2024-08-25 | End: 2024-08-25 | Stop reason: HOSPADM

## 2024-08-25 RX ORDER — IBUPROFEN 800 MG/1
800 TABLET, FILM COATED ORAL EVERY 8 HOURS SCHEDULED
Qty: 120 TABLET | Refills: 3 | Status: SHIPPED | OUTPATIENT
Start: 2024-08-25

## 2024-08-25 RX ORDER — FERROUS SULFATE 325(65) MG
325 TABLET ORAL 2 TIMES DAILY WITH MEALS
Qty: 30 TABLET | Refills: 3 | Status: SHIPPED | OUTPATIENT
Start: 2024-08-25

## 2024-08-25 RX ADMIN — FERROUS SULFATE TAB 325 MG (65 MG ELEMENTAL FE) 325 MG: 325 (65 FE) TAB at 07:59

## 2024-08-25 RX ADMIN — ACETAMINOPHEN 1000 MG: 500 TABLET ORAL at 07:30

## 2024-08-25 RX ADMIN — IBUPROFEN 800 MG: 400 TABLET, FILM COATED ORAL at 07:30

## 2024-08-25 RX ADMIN — DOCUSATE SODIUM 100 MG: 100 CAPSULE, LIQUID FILLED ORAL at 07:32

## 2024-08-25 ASSESSMENT — PAIN - FUNCTIONAL ASSESSMENT: PAIN_FUNCTIONAL_ASSESSMENT: ACTIVITIES ARE NOT PREVENTED

## 2024-08-25 ASSESSMENT — PAIN DESCRIPTION - ORIENTATION: ORIENTATION: LOWER

## 2024-08-25 ASSESSMENT — PAIN DESCRIPTION - LOCATION: LOCATION: ABDOMEN

## 2024-08-25 ASSESSMENT — PAIN DESCRIPTION - DESCRIPTORS: DESCRIPTORS: CRAMPING

## 2024-08-25 ASSESSMENT — PAIN SCALES - GENERAL: PAINLEVEL_OUTOF10: 4

## 2024-08-25 NOTE — DISCHARGE INSTRUCTIONS
After Your Delivery (the Postpartum Period): Care Instructions  Overview     After childbirth (postpartum period), your body goes through many changes as you recover. In these weeks after delivery, try to take good care of yourself. Get rest whenever you can and accept help from others.  It may take 4 to 6 weeks to feel like yourself again, and possibly longer if you had a  birth. You may feel sore or very tired as you recover. After delivery, you may continue to have contractions as the uterus returns to the size it was before your pregnancy. You will also have some vaginal bleeding. And you may have pain around the vagina as you heal. Several days after delivery you may also have pain and swelling in your breasts as they fill with milk. There are things you can do at home to help ease these discomforts.  After childbirth, it's common to feel emotional. You may feel irritable, cry easily, and feel happy one minute and sad the next. This is called the \"baby blues.\" Hormone changes are one cause of these emotional changes. These feelings usually get better within a couple of weeks. If they don't, talk to your doctor or midwife.  In the first couple of weeks after you give birth, your doctor or midwife may want to check in with you and make a plan for follow-up care. You will likely have a complete postpartum visit in the first 3 months after delivery. At that time, your doctor or midwife will check on your recovery and see how you're doing. But if you have questions or concerns before then, you can always call your doctor or midwife.  Follow-up care is a key part of your treatment and safety. Be sure to make and go to all appointments, and call your doctor if you are having problems. It's also a good idea to know your test results and keep a list of the medicines you take.  How can you care for yourself at home?  Taking care of your body  Use pads instead of tampons for bleeding. After birth, you will  groin.  Swelling in the leg or groin.  A color change on the leg or groin. The skin may be reddish or purplish, depending on your usual skin color.     You have signs of preeclampsia, such as:  Sudden swelling of your face, hands, or feet.  New vision problems (such as dimness, blurring, or seeing spots).  A severe headache.     You have signs of heart failure, such as:  New or increased shortness of breath.  New or worse swelling in your legs, ankles, or feet.  Sudden weight gain, such as more than 2 to 3 pounds in a day or 5 pounds in a week.  Feeling so tired or weak that you cannot do your usual activities.     You had spinal or epidural pain relief and have:  New or worse back pain.  Increased pain, swelling, warmth, or redness at the injection site.  Tingling, weakness, or numbness in your legs or groin.   Watch closely for changes in your health, and be sure to contact your doctor or midwife if:    Your vaginal bleeding isn't decreasing.     You feel sad, anxious, or hopeless for more than a few days.     You are having problems with your breasts or breastfeeding.   Where can you learn more?  Go to https://www.[employer].net/patientEd and enter A461 to learn more about \"After Your Delivery (the Postpartum Period): Care Instructions.\"  Current as of: July 10, 2023  Content Version: 14.1  © 2927-8413 [employer].   Care instructions adapted under license by [employer]. If you have questions about a medical condition or this instruction, always ask your healthcare professional. [employer] disclaims any warranty or liability for your use of this information.

## 2024-08-25 NOTE — DISCHARGE SUMMARY
Obstetrical Discharge Summary     Name: Radha Born MRN: [id number]  SSN: [id number]    YOB: 1990  Age: 34 y.o.  Sex: female      Admit Date: 2024    Discharge Date: 2024     Admitting Physician: Otoniel Simental MD     Attending Physician:  Otoniel Simental MD     Admission Diagnoses: Uterine contractions [O47.9]    Discharge Diagnoses:   Information for the patient's :  Born, Boy Radha [[id number]]   @[id number]@     Additional Diagnoses:  No components found for: \"OBEXTABORH\", \"OBEXTABSCRN\", \"OBEXTRUBELLA\", \"OBEXTGRBS\"    Immunization(s): There is no immunization history for the selected administration types on file for this patient.     Hospital Course: Normal hospital course following the delivery.    Patient Instructions:   Current Discharge Medication List        START taking these medications    Details   ibuprofen (ADVIL;MOTRIN) 800 MG tablet Take 1 tablet by mouth every 8 hours  Qty: 120 tablet, Refills: 3      ferrous sulfate (IRON 325) 325 (65 Fe) MG tablet Take 1 tablet by mouth 2 times daily (with meals)  Qty: 30 tablet, Refills: 3           Patient to follow- up at 6 weeks postpartum for a routine postpartum visit. Reference my discharge instructions.    Signed By:  RACHNA Garcia CNM     2024

## 2024-08-25 NOTE — LACTATION NOTE
This note was copied from a baby's chart.     24 1151   Visit Information   Lactation Consult Visit Type IP Initial Consult   Visit Length 15 minutes   Referral Received From Referred by MD   Reason for Visit Education;Normal  Visit   Breast Feeding History/Assessment   Breastfeeding History Yes   Longest duration (#) 5   Longest Duration months   Care Plan/Breast Care   Breast Care Lanolin provided         Lactation discharge education complete. Discussed milk production, nipple/breast care, engorgement, mastitis, and breastfeeding nutrition. Also discussed returning to work and pumping. Questions addressed. Resources provided.

## 2024-08-25 NOTE — PLAN OF CARE
Problem: Pain  Goal: Verbalizes/displays adequate comfort level or baseline comfort level  2024 by Osei Nash RN  Outcome: Progressing  Flowsheets (Taken 2024)  Verbalizes/displays adequate comfort level or baseline comfort level:   Encourage patient to monitor pain and request assistance   Assess pain using appropriate pain scale   Administer analgesics based on type and severity of pain and evaluate response   Implement non-pharmacological measures as appropriate and evaluate response  2024 by Evelina Gottlieb RN  Outcome: Progressing  Flowsheets (Taken 2024 180)  Verbalizes/displays adequate comfort level or baseline comfort level:   Encourage patient to monitor pain and request assistance   Assess pain using appropriate pain scale     Problem: Postpartum  Goal: Experiences normal postpartum course  Description:  Postpartum OB-Pregnancy care plan goal which identifies if the mother is experiencing a normal postpartum course  2024 by Osei Nash RN  Outcome: Progressing  2024 by Evelina Gottlieb RN  Outcome: Progressing  Goal: Appropriate maternal -  bonding  Description:  Postpartum OB-Pregnancy care plan goal which identifies if the mother and  are bonding appropriately  2024 by Osei Nash RN  Outcome: Progressing  2024 by Evelina Gottlieb RN  Outcome: Progressing  Goal: Establishment of infant feeding pattern  Description:  Postpartum OB-Pregnancy care plan goal which identifies if the mother is establishing a feeding pattern with their   2024 by Osei Nash RN  Outcome: Progressing  2024 by Evelina Gottlieb RN  Outcome: Progressing  Goal: Incisions, wounds, or drain sites healing without S/S of infection  2024 by Osie Nash RN  Outcome: Progressing  2024 by Evelina Gottlieb RN  Outcome: Progressing  Flowsheets (Taken 2024)  Incisions,

## 2024-08-25 NOTE — PLAN OF CARE
Problem: Pain  Goal: Verbalizes/displays adequate comfort level or baseline comfort level  2024 1046 by Evelina Gottlieb RN  Outcome: Progressing  2024 by Osei Nash RN  Outcome: Progressing  Flowsheets (Taken 2024)  Verbalizes/displays adequate comfort level or baseline comfort level:   Encourage patient to monitor pain and request assistance   Assess pain using appropriate pain scale   Administer analgesics based on type and severity of pain and evaluate response   Implement non-pharmacological measures as appropriate and evaluate response     Problem: Postpartum  Goal: Experiences normal postpartum course  Description:  Postpartum OB-Pregnancy care plan goal which identifies if the mother is experiencing a normal postpartum course  2024 1046 by Evelina Gottlieb RN  Outcome: Progressing  2024 by Osei Nash RN  Outcome: Progressing  Goal: Appropriate maternal -  bonding  Description:  Postpartum OB-Pregnancy care plan goal which identifies if the mother and  are bonding appropriately  2024 1046 by Evelina Gottlieb RN  Outcome: Progressing  2024 by Osei Nash RN  Outcome: Progressing  Goal: Establishment of infant feeding pattern  Description:  Postpartum OB-Pregnancy care plan goal which identifies if the mother is establishing a feeding pattern with their   2024 1046 by Evelina Gottlieb RN  Outcome: Progressing  2024 by Osei Nash RN  Outcome: Progressing  Goal: Incisions, wounds, or drain sites healing without S/S of infection  2024 1046 by Evelina Gottlieb RN  Outcome: Progressing  2024 by Osei Nash RN  Outcome: Progressing     Problem: Infection - Adult  Goal: Absence of infection at discharge  2024 1046 by Evelina Gottlieb RN  Outcome: Progressing  2024 by Osei Nash RN  Outcome: Progressing  Goal: Absence of infection during hospitalization  2024 104

## 2024-08-25 NOTE — PROGRESS NOTES
Progress Note    Patient: Radha Born MRN: [id number]  SSN: [id number]    YOB: 1990  Age: 34 y.o.  Sex: female      Subjective:     Postpartum Day: 1 Vaginal Delivery    The patient is without complaints. The patient is ambulating well. The patient is tolerating a normal diet. The baby is well.  She is breast feeding.    Objective:      No data found.  LABS: Recent Results (from the past 24 hour(s))   Hemoglobin and Hematocrit    Collection Time: 08/25/24  4:21 AM   Result Value Ref Range    Hemoglobin 9.2 (L) 12.0 - 16.0 g/dL    Hematocrit 28.9 (L) 35.0 - 45.0 %       Lab Results   Component Value Date/Time    HGB 9.2 08/25/2024 04:21 AM     Lab Results   Component Value Date/Time    HCT 28.9 08/25/2024 04:21 AM      Lochia:  appropriate   Uterine Fundus:   firm, 0     Lab/Data Review:  All lab results for the last 24 hours reviewed.    Assessment:     Status post: Doing well postpartum vaginal delivery day 1.    Plan:     Continue day 1 post-vaginal delivery orders. Postpartum care discussed including diet, ambulation, and actvitiy restrictions. Plan for discharge home today with follow-up at 6 weeks postpartum for a routine postpartum visit.    Signed By: RACHNA Garcia CNM     August 25, 2024

## 2024-08-26 LAB — RPR SER QL: NONREACTIVE

## 2024-08-29 ENCOUNTER — APPOINTMENT (OUTPATIENT)
Facility: HOSPITAL | Age: 34
End: 2024-08-29
Payer: COMMERCIAL

## 2024-09-05 ENCOUNTER — APPOINTMENT (OUTPATIENT)
Facility: HOSPITAL | Age: 34
End: 2024-09-05
Payer: COMMERCIAL

## 2024-09-25 ENCOUNTER — HOSPITAL ENCOUNTER (OUTPATIENT)
Facility: HOSPITAL | Age: 34
Setting detail: RECURRING SERIES
Discharge: HOME | End: 2024-09-28
Payer: COMMERCIAL

## 2024-09-25 PROCEDURE — 97530 THERAPEUTIC ACTIVITIES: CPT

## 2024-09-25 PROCEDURE — 97110 THERAPEUTIC EXERCISES: CPT

## 2024-09-25 PROCEDURE — 97112 NEUROMUSCULAR REEDUCATION: CPT

## 2024-09-25 PROCEDURE — 97535 SELF CARE MNGMENT TRAINING: CPT

## 2024-10-14 ENCOUNTER — TELEPHONE (OUTPATIENT)
Facility: HOSPITAL | Age: 34
End: 2024-10-14

## 2024-10-29 ENCOUNTER — HOSPITAL ENCOUNTER (OUTPATIENT)
Facility: HOSPITAL | Age: 34
Discharge: HOME | End: 2024-11-01
Payer: COMMERCIAL

## 2024-10-29 ENCOUNTER — TRANSCRIBE ORDERS (OUTPATIENT)
Facility: HOSPITAL | Age: 34
End: 2024-10-29

## 2024-10-29 DIAGNOSIS — K43.9 VENTRAL HERNIA WITHOUT OBSTRUCTION OR GANGRENE: ICD-10-CM

## 2024-10-29 DIAGNOSIS — K43.2 RECURRENT VENTRAL HERNIA: ICD-10-CM

## 2024-10-29 DIAGNOSIS — K43.0 INCISIONAL HERNIA WITH OBSTRUCTION: Primary | ICD-10-CM

## 2024-10-29 DIAGNOSIS — K43.6 VENTRAL HERNIA WITH OBSTRUCTION BUT NO GANGRENE: ICD-10-CM

## 2024-10-29 DIAGNOSIS — K43.0 INCISIONAL HERNIA WITH OBSTRUCTION: ICD-10-CM

## 2024-10-29 LAB
ANION GAP SERPL CALC-SCNC: 6 MMOL/L (ref 3–18)
BASOPHILS # BLD: 0 K/UL (ref 0–0.1)
BASOPHILS NFR BLD: 1 % (ref 0–2)
BUN SERPL-MCNC: 18 MG/DL (ref 7–18)
BUN/CREAT SERPL: 26 (ref 12–20)
CALCIUM SERPL-MCNC: 8.6 MG/DL (ref 8.5–10.1)
CHLORIDE SERPL-SCNC: 113 MMOL/L (ref 100–111)
CO2 SERPL-SCNC: 25 MMOL/L (ref 21–32)
CREAT SERPL-MCNC: 0.7 MG/DL (ref 0.6–1.3)
DIFFERENTIAL METHOD BLD: ABNORMAL
EKG ATRIAL RATE: 65 BPM
EKG DIAGNOSIS: NORMAL
EKG P AXIS: 79 DEGREES
EKG P-R INTERVAL: 134 MS
EKG Q-T INTERVAL: 410 MS
EKG QRS DURATION: 82 MS
EKG QTC CALCULATION (BAZETT): 426 MS
EKG R AXIS: 84 DEGREES
EKG T AXIS: 14 DEGREES
EKG VENTRICULAR RATE: 65 BPM
EOSINOPHIL # BLD: 0.1 K/UL (ref 0–0.4)
EOSINOPHIL NFR BLD: 2 % (ref 0–5)
ERYTHROCYTE [DISTWIDTH] IN BLOOD BY AUTOMATED COUNT: 15.9 % (ref 11.6–14.5)
FAX TO NUMBER: NORMAL
GLUCOSE SERPL-MCNC: 83 MG/DL (ref 74–99)
HCG SERPL QL: NEGATIVE
HCT VFR BLD AUTO: 37.9 % (ref 35–45)
HGB BLD-MCNC: 12.1 G/DL (ref 12–16)
IMM GRANULOCYTES # BLD AUTO: 0 K/UL (ref 0–0.04)
IMM GRANULOCYTES NFR BLD AUTO: 0 % (ref 0–0.5)
LYMPHOCYTES # BLD: 1.8 K/UL (ref 0.9–3.6)
LYMPHOCYTES NFR BLD: 28 % (ref 21–52)
MCH RBC QN AUTO: 26.5 PG (ref 24–34)
MCHC RBC AUTO-ENTMCNC: 31.9 G/DL (ref 31–37)
MCV RBC AUTO: 83.1 FL (ref 78–100)
MONOCYTES # BLD: 0.4 K/UL (ref 0.05–1.2)
MONOCYTES NFR BLD: 6 % (ref 3–10)
NEUTS SEG # BLD: 3.9 K/UL (ref 1.8–8)
NEUTS SEG NFR BLD: 63 % (ref 40–73)
NRBC # BLD: 0 K/UL (ref 0–0.01)
NRBC BLD-RTO: 0 PER 100 WBC
PLATELET # BLD AUTO: 268 K/UL (ref 135–420)
PMV BLD AUTO: 9.7 FL (ref 9.2–11.8)
POTASSIUM SERPL-SCNC: 4.2 MMOL/L (ref 3.5–5.5)
RBC # BLD AUTO: 4.56 M/UL (ref 4.2–5.3)
SODIUM SERPL-SCNC: 144 MMOL/L (ref 136–145)
TEST RESULTS FAXED TO: NORMAL
WBC # BLD AUTO: 6.3 K/UL (ref 4.6–13.2)

## 2024-10-29 PROCEDURE — 84703 CHORIONIC GONADOTROPIN ASSAY: CPT

## 2024-10-29 PROCEDURE — 80048 BASIC METABOLIC PNL TOTAL CA: CPT

## 2024-10-29 PROCEDURE — 93005 ELECTROCARDIOGRAM TRACING: CPT

## 2024-10-29 PROCEDURE — 36415 COLL VENOUS BLD VENIPUNCTURE: CPT

## 2024-10-29 PROCEDURE — 85025 COMPLETE CBC W/AUTO DIFF WBC: CPT

## 2024-11-12 ENCOUNTER — TELEPHONE (OUTPATIENT)
Facility: HOSPITAL | Age: 34
End: 2024-11-12

## 2024-11-12 NOTE — TELEPHONE ENCOUNTER
Called patient to schedule next follow up and check in post surgery. She stated she would like to DC and will get a new referral when she is ready to return for PT.